# Patient Record
Sex: FEMALE | Race: BLACK OR AFRICAN AMERICAN | NOT HISPANIC OR LATINO | URBAN - METROPOLITAN AREA
[De-identification: names, ages, dates, MRNs, and addresses within clinical notes are randomized per-mention and may not be internally consistent; named-entity substitution may affect disease eponyms.]

---

## 2023-12-05 ENCOUNTER — OFFICE VISIT (OUTPATIENT)
Dept: INTERNAL MEDICINE CLINIC | Age: 53
End: 2023-12-05
Payer: COMMERCIAL

## 2023-12-05 ENCOUNTER — APPOINTMENT (OUTPATIENT)
Dept: LAB | Age: 53
End: 2023-12-05
Payer: COMMERCIAL

## 2023-12-05 VITALS
HEART RATE: 66 BPM | DIASTOLIC BLOOD PRESSURE: 76 MMHG | WEIGHT: 293 LBS | TEMPERATURE: 97 F | HEIGHT: 62 IN | SYSTOLIC BLOOD PRESSURE: 142 MMHG | OXYGEN SATURATION: 99 % | BODY MASS INDEX: 53.92 KG/M2

## 2023-12-05 DIAGNOSIS — Z11.59 NEED FOR HEPATITIS C SCREENING TEST: ICD-10-CM

## 2023-12-05 DIAGNOSIS — Z12.11 SCREENING FOR MALIGNANT NEOPLASM OF COLON: ICD-10-CM

## 2023-12-05 DIAGNOSIS — M19.90 ARTHRITIS: ICD-10-CM

## 2023-12-05 DIAGNOSIS — F41.9 ANXIETY AND DEPRESSION: ICD-10-CM

## 2023-12-05 DIAGNOSIS — Z23 ENCOUNTER FOR IMMUNIZATION: ICD-10-CM

## 2023-12-05 DIAGNOSIS — F32.A ANXIETY AND DEPRESSION: ICD-10-CM

## 2023-12-05 DIAGNOSIS — I10 PRIMARY HYPERTENSION: ICD-10-CM

## 2023-12-05 DIAGNOSIS — E55.9 VITAMIN D DEFICIENCY: ICD-10-CM

## 2023-12-05 DIAGNOSIS — E66.01 MORBID OBESITY WITH BMI OF 50.0-59.9, ADULT (HCC): ICD-10-CM

## 2023-12-05 DIAGNOSIS — E78.2 MIXED HYPERLIPIDEMIA: ICD-10-CM

## 2023-12-05 DIAGNOSIS — Z11.4 SCREENING FOR HIV (HUMAN IMMUNODEFICIENCY VIRUS): ICD-10-CM

## 2023-12-05 DIAGNOSIS — Z23 NEED FOR INFLUENZA VACCINATION: ICD-10-CM

## 2023-12-05 DIAGNOSIS — E66.01 MORBID OBESITY WITH BMI OF 50.0-59.9, ADULT (HCC): Primary | ICD-10-CM

## 2023-12-05 DIAGNOSIS — Z12.4 CERVICAL CANCER SCREENING: ICD-10-CM

## 2023-12-05 DIAGNOSIS — Z12.31 ENCOUNTER FOR SCREENING MAMMOGRAM FOR MALIGNANT NEOPLASM OF BREAST: ICD-10-CM

## 2023-12-05 PROBLEM — E78.5 HYPERLIPIDEMIA: Status: ACTIVE | Noted: 2023-12-05

## 2023-12-05 PROBLEM — J30.2 SEASONAL ALLERGIES: Status: ACTIVE | Noted: 2023-12-05

## 2023-12-05 LAB
25(OH)D3 SERPL-MCNC: 20 NG/ML (ref 30–100)
ALBUMIN SERPL BCP-MCNC: 4.4 G/DL (ref 3.5–5)
ALP SERPL-CCNC: 74 U/L (ref 34–104)
ALT SERPL W P-5'-P-CCNC: 11 U/L (ref 7–52)
ANION GAP SERPL CALCULATED.3IONS-SCNC: 12 MMOL/L
AST SERPL W P-5'-P-CCNC: 16 U/L (ref 13–39)
BASOPHILS # BLD AUTO: 0.03 THOUSANDS/ÂΜL (ref 0–0.1)
BASOPHILS NFR BLD AUTO: 1 % (ref 0–1)
BILIRUB SERPL-MCNC: 0.89 MG/DL (ref 0.2–1)
BUN SERPL-MCNC: 13 MG/DL (ref 5–25)
CALCIUM SERPL-MCNC: 9.9 MG/DL (ref 8.4–10.2)
CHLORIDE SERPL-SCNC: 103 MMOL/L (ref 96–108)
CHOLEST SERPL-MCNC: 241 MG/DL
CO2 SERPL-SCNC: 25 MMOL/L (ref 21–32)
CREAT SERPL-MCNC: 0.91 MG/DL (ref 0.6–1.3)
CRP SERPL QL: 8.5 MG/L
EOSINOPHIL # BLD AUTO: 0.06 THOUSAND/ÂΜL (ref 0–0.61)
EOSINOPHIL NFR BLD AUTO: 2 % (ref 0–6)
ERYTHROCYTE [DISTWIDTH] IN BLOOD BY AUTOMATED COUNT: 12.9 % (ref 11.6–15.1)
ERYTHROCYTE [SEDIMENTATION RATE] IN BLOOD: 41 MM/HOUR (ref 0–29)
EST. AVERAGE GLUCOSE BLD GHB EST-MCNC: 105 MG/DL
GFR SERPL CREATININE-BSD FRML MDRD: 72 ML/MIN/1.73SQ M
GLUCOSE P FAST SERPL-MCNC: 83 MG/DL (ref 65–99)
HBA1C MFR BLD: 5.3 %
HCT VFR BLD AUTO: 38.5 % (ref 34.8–46.1)
HCV AB SER QL: NORMAL
HDLC SERPL-MCNC: 63 MG/DL
HGB BLD-MCNC: 12.4 G/DL (ref 11.5–15.4)
HIV 1+2 AB+HIV1 P24 AG SERPL QL IA: NORMAL
HIV 2 AB SERPL QL IA: NORMAL
HIV1 AB SERPL QL IA: NORMAL
HIV1 P24 AG SERPL QL IA: NORMAL
IMM GRANULOCYTES # BLD AUTO: 0.01 THOUSAND/UL (ref 0–0.2)
IMM GRANULOCYTES NFR BLD AUTO: 0 % (ref 0–2)
LDLC SERPL CALC-MCNC: 164 MG/DL (ref 0–100)
LYMPHOCYTES # BLD AUTO: 1.2 THOUSANDS/ÂΜL (ref 0.6–4.47)
LYMPHOCYTES NFR BLD AUTO: 37 % (ref 14–44)
MCH RBC QN AUTO: 26.4 PG (ref 26.8–34.3)
MCHC RBC AUTO-ENTMCNC: 32.2 G/DL (ref 31.4–37.4)
MCV RBC AUTO: 82 FL (ref 82–98)
MONOCYTES # BLD AUTO: 0.27 THOUSAND/ÂΜL (ref 0.17–1.22)
MONOCYTES NFR BLD AUTO: 8 % (ref 4–12)
NEUTROPHILS # BLD AUTO: 1.64 THOUSANDS/ÂΜL (ref 1.85–7.62)
NEUTS SEG NFR BLD AUTO: 52 % (ref 43–75)
NRBC BLD AUTO-RTO: 0 /100 WBCS
PLATELET # BLD AUTO: 284 THOUSANDS/UL (ref 149–390)
PMV BLD AUTO: 10.9 FL (ref 8.9–12.7)
POTASSIUM SERPL-SCNC: 3.8 MMOL/L (ref 3.5–5.3)
PROT SERPL-MCNC: 7.4 G/DL (ref 6.4–8.4)
RBC # BLD AUTO: 4.7 MILLION/UL (ref 3.81–5.12)
SODIUM SERPL-SCNC: 140 MMOL/L (ref 135–147)
TRIGL SERPL-MCNC: 70 MG/DL
TSH SERPL DL<=0.05 MIU/L-ACNC: 2.13 UIU/ML (ref 0.45–4.5)
WBC # BLD AUTO: 3.21 THOUSAND/UL (ref 4.31–10.16)

## 2023-12-05 PROCEDURE — 90686 IIV4 VACC NO PRSV 0.5 ML IM: CPT

## 2023-12-05 PROCEDURE — 83036 HEMOGLOBIN GLYCOSYLATED A1C: CPT

## 2023-12-05 PROCEDURE — 86803 HEPATITIS C AB TEST: CPT

## 2023-12-05 PROCEDURE — 90471 IMMUNIZATION ADMIN: CPT

## 2023-12-05 PROCEDURE — 85025 COMPLETE CBC W/AUTO DIFF WBC: CPT

## 2023-12-05 PROCEDURE — 87389 HIV-1 AG W/HIV-1&-2 AB AG IA: CPT

## 2023-12-05 PROCEDURE — 80053 COMPREHEN METABOLIC PANEL: CPT

## 2023-12-05 PROCEDURE — 85652 RBC SED RATE AUTOMATED: CPT

## 2023-12-05 PROCEDURE — 82306 VITAMIN D 25 HYDROXY: CPT

## 2023-12-05 PROCEDURE — 84443 ASSAY THYROID STIM HORMONE: CPT

## 2023-12-05 PROCEDURE — 86430 RHEUMATOID FACTOR TEST QUAL: CPT

## 2023-12-05 PROCEDURE — 36415 COLL VENOUS BLD VENIPUNCTURE: CPT

## 2023-12-05 PROCEDURE — 86140 C-REACTIVE PROTEIN: CPT

## 2023-12-05 PROCEDURE — 80061 LIPID PANEL: CPT

## 2023-12-05 PROCEDURE — 99204 OFFICE O/P NEW MOD 45 MIN: CPT

## 2023-12-05 RX ORDER — ROSUVASTATIN CALCIUM 10 MG/1
10 TABLET, COATED ORAL DAILY
Qty: 90 TABLET | Refills: 1 | Status: SHIPPED | OUTPATIENT
Start: 2023-12-05

## 2023-12-05 RX ORDER — ERGOCALCIFEROL 1.25 MG/1
50000 CAPSULE ORAL WEEKLY
COMMUNITY
End: 2023-12-05 | Stop reason: SDUPTHER

## 2023-12-05 RX ORDER — ROSUVASTATIN CALCIUM 10 MG/1
10 TABLET, COATED ORAL DAILY
COMMUNITY
End: 2023-12-05 | Stop reason: SDUPTHER

## 2023-12-05 RX ORDER — ERGOCALCIFEROL 1.25 MG/1
50000 CAPSULE ORAL WEEKLY
Qty: 12 CAPSULE | Refills: 1 | Status: SHIPPED | OUTPATIENT
Start: 2023-12-05

## 2023-12-05 RX ORDER — VALSARTAN AND HYDROCHLOROTHIAZIDE 80; 12.5 MG/1; MG/1
1 TABLET, FILM COATED ORAL DAILY
Qty: 90 TABLET | Refills: 1 | Status: SHIPPED | OUTPATIENT
Start: 2023-12-05

## 2023-12-05 RX ORDER — ESCITALOPRAM OXALATE 10 MG/1
10 TABLET ORAL DAILY
Qty: 90 TABLET | Refills: 0 | Status: SHIPPED | OUTPATIENT
Start: 2023-12-05 | End: 2024-11-29

## 2023-12-05 RX ORDER — VALSARTAN AND HYDROCHLOROTHIAZIDE 80; 12.5 MG/1; MG/1
1 TABLET, FILM COATED ORAL DAILY
COMMUNITY
End: 2023-12-05 | Stop reason: SDUPTHER

## 2023-12-05 NOTE — ASSESSMENT & PLAN NOTE
Patient's medications include Crestor 10 mg daily  No labs or prior health covers it is available at this time  Will recheck lipid panel, patient states that the last time she had labs completed was May 2023  Encourage low-fat, cholesterol diet and encourage exercise 3 to 5 days/week as patient tolerates

## 2023-12-05 NOTE — ASSESSMENT & PLAN NOTE
BP mildly elevated today, will recheck at next visit and if still elevated, can consider medication adjustment  Advised low salt diet and increased exercise  Advised BP monitoring at home

## 2023-12-05 NOTE — ASSESSMENT & PLAN NOTE
Patient reports history of vitamin D deficiency  No labs or records available at this time, patient will sign release for records to be faxed over to office  She is currently taking vitamin D 2 50,000 units weekly  Will recheck vitamin D levels

## 2023-12-05 NOTE — ASSESSMENT & PLAN NOTE
Positive PHQ in office today. Depression symptoms include notably no motivation, no energy, sleeping more, eating less, difficulty with concentration. No SI/HI  Patient is noting increased anxiety and depression surrounding her health and being able to provide for herself  Discussed with patient options for management including beginning antidepressant. She is open to starting Lexapro. Will begin Lexapro 10 mg  Advised patient that she should begin with 5 mg daily for 1 week.   If she is tolerating this well then she can increase to 10 mg daily  Given handout for depression and after visit summary, includes National suicide helpline who patient should call immediately should she ever develop thoughts of harming herself or others  Follow-up scheduled for 12/26/2023  Discussed plan with Dr. Charmayne Bhat who is in agreement

## 2023-12-05 NOTE — PROGRESS NOTES
Assessment/Plan:    1. Morbid obesity with BMI of 50.0-59.9, adult [facility])  Assessment & Plan: Morbid obesity impacting patient's ability to ambulate   Currently works from home  Patient states that since 2020, she has been less active  Patient reports eating 1 meal a day  Discussed healthy eating habits and importance of weight loss. Advised patient to exercise as she is able to tolerate  Referrals to nutrition services and weight management given today  Will order labs as well    Orders:  -     Ambulatory referral to Nutrition Services; Future  -     Ambulatory referral to Weight Management; Future  -     TSH, 3rd generation with Free T4 reflex; Future  -     Hemoglobin A1C; Future    2. Arthritis  Assessment & Plan:  Per patient- she has a history of  arthritis  Patient has difficulty with ambulation, and is unable to walk for long distances  Mother has history of rheumatoid arthritis  Will order RF,  CRP, sed rate  Encourage patient that weight loss can aid in improving arthritis symptoms    Orders:  -     CBC and differential; Future  -     RF Screen w/ Reflex to [place]; Future  -     C-reactive protein; Future  -     Sedimentation rate, automated; Future    3. Mixed hyperlipidemia  Assessment & Plan:  Patient's medications include Crestor 10 mg daily  No labs or prior health covers it is available at this time  Will recheck lipid panel, patient states that the last time she had labs completed was May 2023  Encourage low-fat, cholesterol diet and encourage exercise 3 to 5 days/week as patient tolerates    Orders:  -     rosuvastatin (CRESTOR) 10 MG tablet; Take 1 tablet (10 mg total) by mouth daily  -     Comprehensive metabolic panel; Future  -     Lipid Panel with Direct LDL reflex; Future    4.  Vitamin D deficiency  Assessment & Plan:  Patient reports history of vitamin D deficiency  No labs or records available at this time, patient will sign release for records to be faxed over to office  She is currently taking vitamin D 2 50,000 units weekly  Will recheck vitamin D levels    Orders:  -     ergocalciferol (ERGOCALCIFEROL) 1.25 MG (14283 UT) capsule; Take 1 capsule (50,000 Units total) by mouth once a week  -     Vitamin D 25 hydroxy; Future    5. Primary hypertension  Assessment & Plan:  BP mildly elevated today, will recheck at next visit and if still elevated, can consider medication adjustment  Advised low salt diet and increased exercise  Advised BP monitoring at home     Orders:  -     valsartan-hydrochlorothiazide (DIOVAN-HCT) 80-12.5 MG per tablet; Take 1 tablet by mouth daily  -     Comprehensive metabolic panel; Future  -     CBC and differential; Future  -     Hemoglobin A1C; Future    6. Anxiety and depression  Assessment & Plan:  Positive PHQ in office today. Depression symptoms include notably no motivation, no energy, sleeping more, eating less, difficulty with concentration. No SI/HI  Patient is noting increased anxiety and depression surrounding her health and being able to provide for herself  Discussed with patient options for management including beginning antidepressant. She is open to starting Lexapro. Will begin Lexapro 10 mg  Advised patient that she should begin with 5 mg daily for 1 week. If she is tolerating this well then she can increase to 10 mg daily  Given handout for depression and after visit summary, includes National suicide helpline who patient should call immediately should she ever develop thoughts of harming herself or others  Follow-up scheduled for 12/26/2023  Discussed plan with Dr. Nestor Nunes who is in agreement    Orders:  -     [email or web address] [place]; Future  -     escitalopram (LEXAPRO) 10 mg tablet; Take 1 tablet (10 mg total) by mouth daily    7. Cervical cancer screening  -     Ambulatory Referral to Obstetrics / Gynecology; Future    8. Screening for malignant neoplasm of colon  -     Ambulatory Referral to Gastroenterology; Future    9.  Encounter for screening mammogram for malignant neoplasm of breast  -     Mammo screening bilateral w 3d & cad; Future; Expected date: 12/05/2023    10. Encounter for immunization  -     influenza vaccine, quadrivalent, 0.5 mL, preservative-free, for adult and pediatric patients 6 mos+ (AFLURIA, [address], FLULAVAL, FLUZONE)    11. Need for influenza vaccination  -     influenza vaccine, quadrivalent, 0.5 mL, preservative-free, for adult and pediatric patients 6 mos+ (AFLURIA, FLUARIX, FLULAVAL, FLUZONE)    12. Screening for HIV (human immunodeficiency virus)  -     HIV 1/2 AG/AB w Reflex [facility] for 2 yr old and above; Future    13. Need for hepatitis C screening test  -     Hepatitis C Antibody; Future         BMI Counseling: Body mass index is 56.69 kg/m². The BMI is above normal. Nutrition recommendations include decreasing portion sizes, encouraging healthy choices of fruits and vegetables, limiting drinks that contain sugar, moderation in carbohydrate intake, increasing intake of lean protein, reducing intake of saturated and trans fat and reducing intake of cholesterol. Exercise recommendations include exercising 3-5 times per week. Patient referred to nutritionist and weight management. Rationale for BMI follow-up plan is due to patient being overweight or obese. Depression Screening and Follow-up Plan: Patient's depression screening was positive with a PHQ-2 score of 4. Their PHQ-9 score was 6. Patient assessed for underlying major depression. Brief counseling provided and recommend additional follow-up/re-evaluation next office visit. Patient advised to follow-up with PCP for further management. Will begin Lexapro 10 mg daily. Referral to psychology placed for talk therapy. No SI/HI        M*Modal software was used to dictate this note. It may contain errors with dictating incorrect words or incorrect spelling. Please contact the provider directly with any questions.     Subjective:      Patient ID: Brittny Torres is a 47 y.o. female. Patient is a 31-year-old female with past medical history of hyperlipidemia, hypertension, arthritis presenting to the office to establish care. She recently moved here from [facility] where she was previously seen. She does report that she will get her records transferred over    Patient does have a history of arthritis in bilateral knees. Her pain is worse when walking, and she is unable to ambulate for significant distances. She is not currently on any therapy for pain. Her mother does have a history of rheumatoid arthritis, however the patient has never been tested. She is also noticing increased leg swelling over the last 10 years as she has been gaining weight. Denies any pain or erythema    Patient does report having labs done in May-2023, however results are unavailable at this time. Attempting to contact prior providers to get records. Patient is noting anxiety and depression. She states that she feels anxious almost all the time, and worries about her health, providing for herself. The patient also has noted depression symptoms including lack of motivation, lack of energy, increased sleep, decreased appetite, decreased concentration. She denies any SI/HI at this time, or ever having SI/HI    Patient works as a  filing [employer]. She currently lives with a friend        The following portions of the patient's history were reviewed and updated as appropriate: allergies, current medications, past family history, past medical history, past social history, past surgical history, and problem list.    Review of Systems   Constitutional:  Negative for chills, fatigue and fever. HENT:  Negative for congestion, ear pain, postnasal drip, rhinorrhea, sinus pressure, sore throat and trouble swallowing. Eyes:  Negative for pain and visual disturbance. Respiratory:  Negative for cough, shortness of breath and wheezing.     Cardiovascular: Negative for chest pain, palpitations and leg swelling. Gastrointestinal:  Negative for abdominal pain, nausea and vomiting. Genitourinary:  Negative for difficulty urinating, dysuria and hematuria. Musculoskeletal:  Positive for arthralgias (knees). Negative for back pain. Skin:  Negative for color change, rash and wound. Neurological:  Negative for dizziness, weakness, light-headedness, numbness and headaches. Psychiatric/Behavioral:  Positive for dysphoric mood. Negative for sleep disturbance. The patient is nervous/anxious. All other systems reviewed and are negative. Past Medical History:   Diagnosis Date    Arthritis     Hyperlipidemia     Hypertension          Current Outpatient Medications:     ergocalciferol (ERGOCALCIFEROL) 1.25 MG (32831 UT) capsule, Take 1 capsule (50,000 Units total) by mouth once a week, Disp: 12 capsule, Rfl: 1    escitalopram (LEXAPRO) 10 mg tablet, Take 1 tablet (10 mg total) by mouth daily, Disp: 90 tablet, Rfl: 0    rosuvastatin (CRESTOR) 10 MG tablet, Take 1 tablet (10 mg total) by mouth daily, Disp: 90 tablet, Rfl: 1    valsartan-hydrochlorothiazide (DIOVAN-HCT) 80-12.5 MG per tablet, Take 1 tablet by mouth daily, Disp: 90 tablet, Rfl: 1    No Known Allergies    Social History   History reviewed. No pertinent surgical history. Family History   Problem Relation Age of Onset    Arthritis Mother     Heart attack Mother 61    Hypertension Father     Heart disease Father     Prostate cancer Father     Diabetes Father        Objective:  /76 (BP Location: Left arm, Patient Position: Sitting, Cuff Size: Large)   Pulse 66   Temp (!) 97 °F (36.1 °C) (Temporal)   Ht 5' 2.21" (1.58 m)   Wt (!) 142 kg (312 lb)   SpO2 99% Comment: room air  BMI 56.69 kg/m²      Physical Exam  Vitals and nursing note reviewed. Constitutional:       General: She is not in acute distress. Appearance: She is obese. She is not ill-appearing.    HENT:      Head: Normocephalic and atraumatic. Right Ear: External ear normal.      Left Ear: External ear normal.      Nose: Nose normal.      Mouth/Throat:      Mouth: Mucous membranes are moist.      Pharynx: Oropharynx is clear. No posterior oropharyngeal erythema. Eyes:      General: No scleral icterus. Extraocular Movements: Extraocular movements intact. Conjunctiva/sclera: Conjunctivae normal.      Pupils: Pupils are equal, round, and reactive to light. Cardiovascular:      Rate and Rhythm: Normal rate and regular rhythm. Heart sounds: Normal heart sounds. No murmur heard. No friction rub. No gallop. Pulmonary:      Effort: Pulmonary effort is normal. No respiratory distress. Breath sounds: No wheezing, rhonchi or rales. Chest:      Chest wall: No tenderness. Musculoskeletal:         General: No tenderness. Cervical back: No tenderness. Right lower leg: Edema present. Left lower leg: Edema present. Skin:     General: Skin is warm and dry. Coloration: Skin is not pale. Findings: No bruising, erythema, lesion or rash. Neurological:      General: No focal deficit present. Mental Status: She is alert and oriented to person, place, and time. Mental status is at baseline. Psychiatric:         Behavior: Behavior normal.      Comments: Patient is tearful when discussing her symptoms of depression and anxiety.   She answered all questions appropriately

## 2023-12-05 NOTE — PATIENT INSTRUCTIONS
Obesity   AMBULATORY CARE:   Obesity  means your body mass index (BMI) is greater than 30. Your healthcare provider will use your age, height, and weight to measure your BMI. The risks of obesity include  many health problems, including injuries or physical disability. Diabetes (high blood sugar level)    High blood pressure or high cholesterol    Heart disease or heart failure    Stroke    Gallbladder or liver disease    Cancer of the colon, breast, prostate, liver, or kidney    Sleep apnea    Arthritis or gout    Screening  is done to check for health conditions before you have signs or symptoms. If you are 28to 79years old, your blood sugar level may be checked every 3 years for signs of prediabetes or diabetes. Your healthcare provider will check your blood pressure at each visit. High blood pressure can lead to a stroke or other problems. Your provider may check for signs of heart disease, cancer, or other health problems. Seek care immediately if:   You have a severe headache, confusion, or difficulty speaking. You have weakness on one side of your body. You have chest pain, sweating, or shortness of breath. Call your doctor if:   You have symptoms of gallbladder or liver disease, such as pain in your upper abdomen. You have knee or hip pain and discomfort while walking. You have symptoms of diabetes, such as intense hunger and thirst, and frequent urination. You have symptoms of sleep apnea, such as snoring or daytime sleepiness. You have questions or concerns about your condition or care. Treatment for obesity  focuses on helping you lose weight to improve your health. Even a small decrease in BMI can reduce the risk for many health problems. Your healthcare provider will help you set a weight-loss goal.  Lifestyle changes  are the first step in treating obesity. These include making healthy food choices and getting regular physical activity.  Your healthcare provider may suggest a weight-loss program that involves coaching, education, and therapy. Medicine  may help you lose weight when it is used with a healthy foods and physical activity. Surgery  can help you lose weight if you have obesity along with other health problems. Several types of weight-loss surgery are available. Ask your healthcare provider for more information. Tips for safe weight loss:   Set small, realistic goals. An example of a small goal is to walk for 20 minutes 5 days a week. Anther goal is to lose 5% of your body weight. Ask for support. Tell friends, family members, and coworkers about your goals. Ask someone to lose weight with you. You may also want to join a weight-loss support group. Identify foods or triggers that may cause you to overeat. Remove tempting high-calorie foods from your home and workplace. Place a bowl of fresh fruit on your kitchen counter. If stress causes you to eat, find other ways to cope with stress. A counselor or therapist may be able to help you. Track your daily calories and activity. Write down what you eat and drink. Also write down how many minutes of physical activity you do each day. Track your weekly weight. Weigh yourself in the morning, before you eat or drink anything but after you use the bathroom. Use the same scale, in the same place, and in similar clothing each time. Only weigh yourself 1 to 2 times each week, or as directed. You may become discouraged if you weigh yourself every day. Eating changes: You will need to eat 500 to 1,000 fewer calories each day than you currently eat to lose 1 to 2 pounds a week. The following changes will help you cut calories:  Eat smaller portions. Use small plates, no larger than 9 inches in diameter. Fill your plate half full of fruits and vegetables. Measure your food using measuring cups until you know what a serving size looks like. Eat 3 meals and 1 or 2 snacks each day.   Plan your meals in advance. Mamikellie Rendon and eat at home most of the time. Eat slowly. Do not skip meals. Skipping meals can lead to overeating later in the day. This can make it harder for you to lose weight. Talk with a dietitian to help you make a meal plan and schedule that is right for you. Eat fruits and vegetables at every meal.  They are low in calories and high in fiber, which makes you feel full. Do not add butter, margarine, or cream sauce to vegetables. Use herbs to season steamed vegetables. Eat less fat and fewer fried foods. Eat more baked or grilled chicken and fish. These protein sources are lower in calories and fat than red meat. Limit fast food. Dress your salads with olive oil and vinegar instead of bottled dressing. Limit the amount of sugar you eat. Do not drink sugary beverages. Limit alcohol. Activity changes:  Physical activity is good for your body in many ways. It helps you burn calories and build strong muscles. It decreases stress and depression, and improves your mood. It can also help you sleep better. Talk to your healthcare provider before you begin an exercise program.  Exercise for at least 30 minutes 5 days a week. Start slowly. Set aside time each day for physical activity that you enjoy and that is convenient for you. It is best to do both weight training and an activity that increases your heart rate, such as walking, bicycling, or swimming. Find ways to be more active. Do yard work and housecleaning. Walk up the stairs instead of using elevators. Spend your leisure time going to events that require walking, such as outdoor festivals or fairs. This extra physical activity can help you lose weight and keep it off. Follow up with your doctor as directed: You may need to meet with a dietitian. Write down your questions so you remember to ask them during your visits.   © Copyright [employer] 2023 Information is for End User's use only and may not be sold, redistributed or otherwise used for commercial purposes. The above information is an  only. It is not intended as medical advice for individual conditions or treatments. Talk to your doctor, nurse or pharmacist before following any medical regimen to see if it is safe and effective for you.

## 2023-12-05 NOTE — ASSESSMENT & PLAN NOTE
Per patient- she has a history of  arthritis  Patient has difficulty with ambulation, and is unable to walk for long distances  Mother has history of rheumatoid arthritis  Will order RF,  CRP, sed rate  Encourage patient that weight loss can aid in improving arthritis symptoms

## 2023-12-05 NOTE — ASSESSMENT & PLAN NOTE
Morbid obesity impacting patient's ability to ambulate   Currently works from home  Patient states that since 2020, she has been less active  Patient reports eating 1 meal a day  Discussed healthy eating habits and importance of weight loss.   Advised patient to exercise as she is able to tolerate  Referrals to nutrition services and weight management given today  Will order labs as well

## 2023-12-06 LAB — RHEUMATOID FACT SER QL LA: NEGATIVE

## 2023-12-26 ENCOUNTER — OFFICE VISIT (OUTPATIENT)
Dept: INTERNAL MEDICINE CLINIC | Age: 53
End: 2023-12-26
Payer: COMMERCIAL

## 2023-12-26 ENCOUNTER — TELEPHONE (OUTPATIENT)
Dept: INTERNAL MEDICINE CLINIC | Age: 53
End: 2023-12-26

## 2023-12-26 VITALS
HEART RATE: 94 BPM | TEMPERATURE: 97.8 F | HEIGHT: 62 IN | DIASTOLIC BLOOD PRESSURE: 70 MMHG | OXYGEN SATURATION: 98 % | WEIGHT: 293 LBS | BODY MASS INDEX: 53.92 KG/M2 | SYSTOLIC BLOOD PRESSURE: 138 MMHG

## 2023-12-26 DIAGNOSIS — E78.2 MIXED HYPERLIPIDEMIA: ICD-10-CM

## 2023-12-26 DIAGNOSIS — I10 PRIMARY HYPERTENSION: Primary | ICD-10-CM

## 2023-12-26 DIAGNOSIS — M19.90 ARTHRITIS: ICD-10-CM

## 2023-12-26 DIAGNOSIS — F32.A ANXIETY AND DEPRESSION: ICD-10-CM

## 2023-12-26 DIAGNOSIS — E66.01 MORBID OBESITY WITH BMI OF 50.0-59.9, ADULT (HCC): ICD-10-CM

## 2023-12-26 DIAGNOSIS — E55.9 VITAMIN D DEFICIENCY: ICD-10-CM

## 2023-12-26 DIAGNOSIS — F41.9 ANXIETY AND DEPRESSION: ICD-10-CM

## 2023-12-26 PROCEDURE — 99214 OFFICE O/P EST MOD 30 MIN: CPT

## 2023-12-26 RX ORDER — ROSUVASTATIN CALCIUM 20 MG/1
20 TABLET, COATED ORAL DAILY
Qty: 90 TABLET | Refills: 1 | Status: SHIPPED | OUTPATIENT
Start: 2023-12-26

## 2023-12-26 NOTE — ASSESSMENT & PLAN NOTE
Morbid obesity impacting patient's ability to ambulate   Currently works from home  Discussed healthy eating habits and importance of weight loss.  Advised patient to exercise as she is able to tolerate  Referrals to nutrition services and weight management given   Scheduled with nutritional services on 1-

## 2023-12-26 NOTE — PROGRESS NOTES
" Assessment/Plan:    1. Primary hypertension  Assessment & Plan:  Controlled. Continue current management   Advised low salt diet and increased exercise  Advised BP monitoring at home     Orders:  -     Comprehensive metabolic panel; Future  -     CBC and differential; Future    2. Mixed hyperlipidemia  Assessment & Plan:  Cholesterol 241, , triglycerides 70, HDL 63 increase Crestor to 20 mg daily  Encourage low-fat, cholesterol diet and encourage exercise 3 to 5 days/week as patient tolerates    Orders:  -     rosuvastatin (CRESTOR) 20 MG tablet; Take 1 tablet (20 mg total) by mouth daily  -     Comprehensive metabolic panel; Future  -     Lipid Panel with Direct LDL reflex; Future    3. Vitamin D deficiency  Assessment & Plan:  Vitamin D 20.0  She is currently taking vitamin D 2 50,000 units weekly  Will add vitamin D 2000 units daily  Recheck in 3 months    Orders:  -     Vitamin D 25 hydroxy; Future    4. Arthritis  Assessment & Plan:  Per patient- she has a history of  arthritis  Patient has difficulty with ambulation, and is unable to walk for long distances  Mother has history of rheumatoid arthritis  Mild elevations in CRP and sed rate, rheumatoid factor and  Mild encourage patient that weight loss can aid in improving arthritis symptoms  Advise Voltaren gel topically daily for bilateral knee pain  Will follow-up in 3 months  Patient has paperwork to be completed through employer for modifications.  Will fill out once paperwork is able to be obtained.  Patient will upload to [employer] so that we can print      5. Anxiety and depression  Assessment & Plan:  Tolerating Lexapro 10 mg well, continue  Denies any symptoms of anxiety or depression, notes that family and friends states she is \"back to her old self \"  No SI/HI  Follow-up in 3 months.  Call sooner if any change      6. Morbid obesity with BMI of 50.0-59.9, adult (HCC)  Assessment & Plan:  Morbid obesity impacting patient's ability to ambulate " "  Currently works from home  Discussed healthy eating habits and importance of weight loss.  Advised patient to exercise as she is able to tolerate  Referrals to nutrition services and weight management given   Scheduled with nutritional services on 1-                M*Modal software was used to dictate this note.  It may contain errors with dictating incorrect words or incorrect spelling. Please contact the provider directly with any questions.    Subjective:      Patient ID: Leora Lemus is a 53 y.o. female.    Patient is a 53-year-old female presenting to the office for 2-week follow-up and review of labs.     Hypertension  Does not monitor blood pressure at home, BP controlled today    Hyperlipidemia  Cholesterol 241, triglycerides 70, HDL 63,   Currently taking Crestor 10 mg daily    Anxiety and depression  Tolerating Lexapro 10 mg well  Feels that her mood has stabilized, friends and family have noticed that she is \"back to her old self \"  Denies any anxiety or depression today  Communicated with psychiatry, is on wait list    Morbid obesity  1 pound weight loss since last visit  Encourage patient to increase walking, and she states that she is not very active  Needs paperwork to be completed for work modifications    Mammo: Scheduled for April 2024  [facility]: GI appointment  1-30-24  Will call GYN to set appointment for cervical cancer screening        The following portions of the patient's history were reviewed and updated as appropriate: allergies, current medications, past family history, past medical history, past social history, past surgical history, and problem list.    Review of Systems   Constitutional:  Negative for chills, fatigue and fever.   HENT:  Negative for congestion, ear pain, postnasal drip, rhinorrhea, sinus pressure, sore throat and trouble swallowing.    Eyes:  Negative for pain and visual disturbance.   Respiratory:  Negative for cough, shortness of breath and wheezing.  " "  Cardiovascular:  Negative for chest pain, palpitations and leg swelling.   Gastrointestinal:  Negative for abdominal pain, nausea and vomiting.   Musculoskeletal:  Positive for arthralgias.   Skin:  Negative for color change, rash and wound.   Neurological:  Negative for dizziness, weakness, light-headedness, numbness and headaches.   Psychiatric/Behavioral:  Negative for dysphoric mood and sleep disturbance. The patient is not nervous/anxious.    All other systems reviewed and are negative.        Past Medical History:   Diagnosis Date    Arthritis     Hyperlipidemia     Hypertension          Current Outpatient Medications:     ergocalciferol (ERGOCALCIFEROL) 1.25 MG (59092 UT) capsule, Take 1 capsule (50,000 Units total) by mouth once a week, Disp: 12 capsule, Rfl: 1    escitalopram (LEXAPRO) 10 mg tablet, Take 1 tablet (10 mg total) by mouth daily, Disp: 90 tablet, Rfl: 0    rosuvastatin (CRESTOR) 20 MG tablet, Take 1 tablet (20 mg total) by mouth daily, Disp: 90 tablet, Rfl: 1    valsartan-hydrochlorothiazide (DIOVAN-HCT) 80-12.5 MG per tablet, Take 1 tablet by mouth daily, Disp: 90 tablet, Rfl: 1    No Known Allergies    Social History   History reviewed. No pertinent surgical history.  Family History   Problem Relation Age of Onset    Arthritis Mother     Heart attack Mother 63    Hypertension Father     Heart disease Father     Prostate cancer Father     Diabetes Father        Objective:  /70 (BP Location: Left arm, Patient Position: Sitting, Cuff Size: Large)   Pulse 94   Temp 97.8 °F (36.6 °C) (Temporal)   Ht 5' 2.21\" (1.58 m)   Wt (!) 141 kg (311 lb)   SpO2 98%   BMI 56.50 kg/m²      Physical Exam  Vitals and nursing note reviewed.   Constitutional:       General: She is not in acute distress.     Appearance: She is obese. She is not ill-appearing.   HENT:      Head: Normocephalic and atraumatic.      Right Ear: External ear normal.      Left Ear: External ear normal.      Nose: Nose normal. "      Mouth/Throat:      Mouth: Mucous membranes are moist.      Pharynx: Oropharynx is clear. No posterior oropharyngeal erythema.   Eyes:      General: No scleral icterus.     Conjunctiva/sclera: Conjunctivae normal.      Pupils: Pupils are equal, round, and reactive to light.   Cardiovascular:      Rate and Rhythm: Normal rate and regular rhythm.      Heart sounds: Normal heart sounds. No murmur heard.     No friction rub. No gallop.   Pulmonary:      Effort: Pulmonary effort is normal. No respiratory distress.      Breath sounds: Normal breath sounds. No wheezing, rhonchi or rales.   Musculoskeletal:      Cervical back: Normal range of motion and neck supple. No tenderness.      Right knee: No swelling or deformity. Tenderness present.      Left knee: No swelling or deformity. Tenderness present.      Right lower leg: No edema.      Left lower leg: No edema.      Comments: Bilateral diffuse tenderness to knees with palpation   Lymphadenopathy:      Cervical: No cervical adenopathy.   Skin:     General: Skin is warm and dry.      Coloration: Skin is not pale.      Findings: No erythema, lesion or rash.   Neurological:      Mental Status: She is alert.      Cranial Nerves: No cranial nerve deficit.      Coordination: Coordination normal.   Psychiatric:         Mood and Affect: Mood normal.         Behavior: Behavior normal.

## 2023-12-26 NOTE — ASSESSMENT & PLAN NOTE
Cholesterol 241, , triglycerides 70, HDL 63 increase Crestor to 20 mg daily  Encourage low-fat, cholesterol diet and encourage exercise 3 to 5 days/week as patient tolerates

## 2023-12-26 NOTE — ASSESSMENT & PLAN NOTE
Per patient- she has a history of  arthritis  Patient has difficulty with ambulation, and is unable to walk for long distances  Mother has history of rheumatoid arthritis  Mild elevations in CRP and sed rate, rheumatoid factor and  Mild encourage patient that weight loss can aid in improving arthritis symptoms  Advise Voltaren gel topically daily for bilateral knee pain  Will follow-up in 3 months  Patient has paperwork to be completed through employer for modifications.  Will fill out once paperwork is able to be obtained.  Patient will upload to [employer] so that we can print

## 2023-12-26 NOTE — ASSESSMENT & PLAN NOTE
Vitamin D 20.0  She is currently taking vitamin D 2 50,000 units weekly  Will add vitamin D 2000 units daily  Recheck in 3 months

## 2023-12-26 NOTE — ASSESSMENT & PLAN NOTE
Controlled. Continue current management   Advised low salt diet and increased exercise  Advised BP monitoring at home

## 2023-12-26 NOTE — PATIENT INSTRUCTIONS
- Add Vitamin D3 2000 IU daily   - Voltaren gel- apply to knees daily, avoid contact with face/eyes/moth

## 2023-12-26 NOTE — TELEPHONE ENCOUNTER
Received paperwork for patient to be completed, scanned into media and giving to rowan.      Patient already paid the 30$ form fee.

## 2023-12-26 NOTE — ASSESSMENT & PLAN NOTE
"Tolerating Lexapro 10 mg well, continue  Denies any symptoms of anxiety or depression, notes that family and friends states she is \"back to her old self \"  No SI/HI  Follow-up in 3 months.  Call sooner if any change  "

## 2024-01-30 ENCOUNTER — OFFICE VISIT (OUTPATIENT)
Dept: GASTROENTEROLOGY | Facility: AMBULARY SURGERY CENTER | Age: 54
End: 2024-01-30
Payer: COMMERCIAL

## 2024-01-30 VITALS
DIASTOLIC BLOOD PRESSURE: 96 MMHG | OXYGEN SATURATION: 97 % | WEIGHT: 293 LBS | SYSTOLIC BLOOD PRESSURE: 146 MMHG | BODY MASS INDEX: 53.92 KG/M2 | HEART RATE: 92 BPM | HEIGHT: 62 IN

## 2024-01-30 DIAGNOSIS — Z12.11 SCREENING FOR COLON CANCER: Primary | ICD-10-CM

## 2024-01-30 PROCEDURE — 99203 OFFICE O/P NEW LOW 30 MIN: CPT | Performed by: INTERNAL MEDICINE

## 2024-01-30 RX ORDER — SODIUM, POTASSIUM,MAG SULFATES 17.5-3.13G
177 SOLUTION, RECONSTITUTED, ORAL ORAL ONCE
Qty: 177 ML | Refills: 0 | Status: SHIPPED | OUTPATIENT
Start: 2024-01-30 | End: 2024-01-30

## 2024-01-30 NOTE — PROGRESS NOTES
[facility] - Outpatient Consultation  Leora Lemus 53 y.o. female MRN: [id number]  Encounter: [id number]      PCP: Phuong Good PA-C  Referring: No referring provider defined for this encounter.      ASSESSMENT AND PLAN:      1. Screening for colon cancer  Average risk, index screening colonoscopy  We reviewed risks benefits and alternates of colonoscopy.  Risks include but not limited to infection, bleeding, perforation, missed lesion.  Bowel prep instructions given.  - Colonoscopy; Future  - Na Sulfate-K Sulfate-Mg Sulf (Suprep Bowel Prep Kit) 17.5-3.13-1.6 GM/177ML SOLN; Take 177 mL by mouth once for 1 dose Take 177 mL by mouth once for 1 dose  Dispense: 177 mL; Refill: 0    ______________________________________________________________________    CC:  Chief Complaint   Patient presents with    Screening Colonoscopy       HPI:      Patient is a 53-year-old female referred for colon cancer screening.  She has HTN, arthritis, HLD, seasonal allergies, vitamin D deficiency, anxiety/depression, BMI 56.  She has bowel movements 1-2 times per day.  She denies any abdominal pain, upper GI symptoms.  She has no family history of GI cancers.      REVIEW OF SYSTEMS:    CONSTITUTIONAL: Denies any fever, chills, rigors, and weight loss.  HEENT: No earache or tinnitus. Denies hearing loss or visual disturbances.  CARDIOVASCULAR: No chest pain or palpitations.   RESPIRATORY: Denies any cough, hemoptysis, shortness of breath or dyspnea on exertion.  GASTROINTESTINAL: As noted in the History of Present Illness.   GENITOURINARY: No problems with urination. Denies any hematuria or dysuria.  NEUROLOGIC: No dizziness or vertigo, denies headaches.   MUSCULOSKELETAL: Denies any muscle or joint pain.   SKIN: Denies skin rashes or itching.   ENDOCRINE: Denies excessive thirst. Denies intolerance to heat or cold.  PSYCHOSOCIAL: Denies depression or anxiety. Denies any recent memory loss.       Historical  "Information   Past Medical History:   Diagnosis Date    Arthritis     Hyperlipidemia     Hypertension      History reviewed. No pertinent surgical history.  Social History   Social History     Substance and Sexual Activity   Alcohol Use Yes    Comment: socially     Social History     Substance and Sexual Activity   Drug Use Never     Social History     Tobacco Use   Smoking Status Never   Smokeless Tobacco Never     Family History   Problem Relation Age of Onset    Arthritis Mother     Heart attack Mother 63    Hypertension Father     Heart disease Father     Prostate cancer Father     Diabetes Father        Meds/Allergies       Current Outpatient Medications:     ergocalciferol (ERGOCALCIFEROL) 1.25 MG (80547 UT) capsule    escitalopram (LEXAPRO) 10 mg tablet    rosuvastatin (CRESTOR) 20 MG tablet    valsartan-hydrochlorothiazide (DIOVAN-HCT) 80-12.5 MG per tablet    No Known Allergies        Objective     Blood pressure 146/96, pulse 92, height 5' 2\" (1.575 m), weight (!) 140 kg (307 lb 12.8 oz), SpO2 97%. Body mass index is 56.3 kg/m².     PHYSICAL EXAM:      General Appearance:   Alert, cooperative, no distress   HEENT:   Normocephalic, atraumatic, anicteric.     Neck:  Supple, symmetrical, trachea midline   Lungs:   Clear to auscultation bilaterally; no rales, rhonchi or wheezing; respirations unlabored    Heart::   Regular rate and rhythm; no murmur, rub, or gallop.   Abdomen:   Soft, non-tender, non-distended; normal bowel sounds; no masses, no organomegaly    Genitalia:   Deferred    Rectal:   Deferred    Extremities:  No cyanosis, clubbing or edema    Pulses:  2+ and symmetric    Skin:  No jaundice, rashes, or lesions    Lymph nodes:  No palpable cervical lymphadenopathy        Lab Results:     Lab Results   Component Value Date    WBC 3.21 (L) 12/05/2023    HGB 12.4 12/05/2023    HCT 38.5 12/05/2023    MCV 82 12/05/2023     12/05/2023       Lab Results   Component Value Date    K 3.8 12/05/2023    " " 12/05/2023    CO2 25 12/05/2023    BUN 13 12/05/2023    CREATININE 0.91 12/05/2023    GLUF 83 12/05/2023    CALCIUM 9.9 12/05/2023    AST 16 12/05/2023    ALT 11 12/05/2023    ALKPHOS 74 12/05/2023    EGFR 72 12/05/2023     Relevant labs reviewed    No results found for: \"INR\", \"PROTIME\"      Radiology Results:   No results found.    Portions of the record may have been created with voice recognition software. Occasional wrong word or \"sound a like\" substitutions may have occurred due to the inherent limitations of voice recognition software. Read the chart carefully and recognize, using context, where substitutions have occurred.        "

## 2024-01-30 NOTE — PATIENT INSTRUCTIONS
Scheduled date of colonoscopy (as of today): May 2, 2024  Physician performing colonoscopy: Dr. Connolly   Location of colonoscopy: [facility]  Bowel prep reviewed with patient: Suprep  Instructions reviewed with patient by: Shweta BRYANT  Clearances: n/a

## 2024-02-05 ENCOUNTER — OFFICE VISIT (OUTPATIENT)
Dept: GYNECOLOGY | Facility: CLINIC | Age: 54
End: 2024-02-05
Payer: COMMERCIAL

## 2024-02-05 VITALS
SYSTOLIC BLOOD PRESSURE: 122 MMHG | BODY MASS INDEX: 53.92 KG/M2 | WEIGHT: 293 LBS | DIASTOLIC BLOOD PRESSURE: 90 MMHG | HEIGHT: 62 IN

## 2024-02-05 DIAGNOSIS — N92.6 IRREGULAR MENSES: ICD-10-CM

## 2024-02-05 DIAGNOSIS — Z12.11 SCREENING FOR COLORECTAL CANCER: ICD-10-CM

## 2024-02-05 DIAGNOSIS — Z01.419 ENCOUNTER FOR ROUTINE GYNECOLOGICAL EXAMINATION WITH PAPANICOLAOU SMEAR OF CERVIX: ICD-10-CM

## 2024-02-05 DIAGNOSIS — Z12.39 ENCOUNTER FOR SCREENING BREAST EXAMINATION: ICD-10-CM

## 2024-02-05 DIAGNOSIS — Z12.12 SCREENING FOR COLORECTAL CANCER: ICD-10-CM

## 2024-02-05 DIAGNOSIS — Z12.31 ENCOUNTER FOR SCREENING MAMMOGRAM FOR MALIGNANT NEOPLASM OF BREAST: ICD-10-CM

## 2024-02-05 DIAGNOSIS — Z12.4 CERVICAL CANCER SCREENING: ICD-10-CM

## 2024-02-05 DIAGNOSIS — Z01.419 ENCOUNTER FOR WELL WOMAN EXAM: Primary | ICD-10-CM

## 2024-02-05 PROCEDURE — G0145 SCR C/V CYTO,THINLAYER,RESCR: HCPCS | Performed by: PHYSICIAN ASSISTANT

## 2024-02-05 PROCEDURE — 99386 PREV VISIT NEW AGE 40-64: CPT | Performed by: PHYSICIAN ASSISTANT

## 2024-02-05 PROCEDURE — G0476 HPV COMBO ASSAY CA SCREEN: HCPCS | Performed by: PHYSICIAN ASSISTANT

## 2024-02-06 NOTE — PROGRESS NOTES
Assessment/Plan:    No problem-specific Assessment & Plan notes found for this encounter.       Diagnoses and all orders for this visit:    Encounter for well woman exam  -     Liquid-based pap, screening    Encounter for screening breast examination    Cervical cancer screening  -     Ambulatory Referral to Obstetrics / Gynecology  -     Liquid-based pap, screening    Irregular menses    Encounter for screening mammogram for malignant neoplasm of breast  -     Mammo screening bilateral w 3d & cad; Future    Screening for colorectal cancer  -     Ambulatory Referral to Gastroenterology; Future    Encounter for routine gynecological examination with Papanicolaou smear of cervix  -     Liquid-based pap, screening          Subjective:      Patient ID: Leora Lemus is a 53 y.o. female.    Pt presents as a new patient for her 1st annual exam today--  She has no complaints except occ hot flash  She has irregular bleeding  no pelvic pain  Bowel and bladder are regular  Colonoscopy--scheduled  No breast concerns today  Last mammo--scheduled    pap today.    Rx mamm  Rx colon  Daily ca, d  Natural hot flash info given        The following portions of the patient's history were reviewed and updated as appropriate: allergies, current medications, past family history, past medical history, past social history, past surgical history, and problem list.    Review of Systems   Constitutional:  Negative for chills, fever and unexpected weight change.   HENT:  Negative for ear pain and sore throat.    Eyes:  Negative for pain and visual disturbance.   Respiratory:  Negative for cough and shortness of breath.    Cardiovascular:  Negative for chest pain and palpitations.   Gastrointestinal:  Negative for abdominal pain, blood in stool, constipation, diarrhea and vomiting.   Genitourinary: Negative.  Negative for dysuria and hematuria.   Musculoskeletal:  Negative for arthralgias and back pain.   Skin:  Negative for color change and rash.  "  Neurological:  Negative for seizures and syncope.   All other systems reviewed and are negative.        Objective:      /90   Ht 5' 2\" (1.575 m)   Wt (!) 141 kg (309 lb 12.8 oz)   LMP 08/15/2023 (Exact Date)   BMI 56.66 kg/m²          Physical Exam  Vitals and nursing note reviewed.   Constitutional:       Appearance: Normal appearance. She is well-developed.   HENT:      Head: Normocephalic and atraumatic.   Chest:   Breasts:     Right: No inverted nipple, mass, nipple discharge or skin change.      Left: No inverted nipple, mass, nipple discharge or skin change.   Abdominal:      Palpations: Abdomen is soft.   Genitourinary:     General: Normal vulva.      Exam position: Supine.      Labia:         Right: No rash, tenderness or lesion.         Left: No rash, tenderness or lesion.       Vagina: Normal.      Cervix: No cervical motion tenderness, discharge or friability.      Uterus: Normal.       Adnexa:         Right: No mass, tenderness or fullness.          Left: No mass, tenderness or fullness.     Musculoskeletal:      Cervical back: Normal range of motion.   Lymphadenopathy:      Lower Body: No right inguinal adenopathy. No left inguinal adenopathy.   Neurological:      Mental Status: She is alert.           "

## 2024-02-07 ENCOUNTER — CLINICAL SUPPORT (OUTPATIENT)
Dept: NUTRITION | Facility: HOSPITAL | Age: 54
End: 2024-02-07
Payer: COMMERCIAL

## 2024-02-07 VITALS — WEIGHT: 293 LBS | BODY MASS INDEX: 56.99 KG/M2

## 2024-02-07 DIAGNOSIS — E66.01 MORBID OBESITY WITH BMI OF 50.0-59.9, ADULT (HCC): ICD-10-CM

## 2024-02-07 LAB
HPV HR 12 DNA CVX QL NAA+PROBE: NEGATIVE
HPV16 DNA CVX QL NAA+PROBE: NEGATIVE
HPV18 DNA CVX QL NAA+PROBE: NEGATIVE

## 2024-02-07 PROCEDURE — 97802 MEDICAL NUTRITION INDIV IN: CPT

## 2024-02-07 NOTE — PROGRESS NOTES
" Nutrition Assessment Form    Patient Name: Leora Lemus    YOB: 1970    Sex: Female     Assessment Date: 2/7/2024  Start Time: 9 am Stop Time: 10 am Total Minutes: 60     Data:  Present at session: self   Parent/Patient Concerns/reason for visit: \"I need to lose weight\"   Medical Dx/Reason for Referral: E66.01, Z68.54   Past Medical History:   Diagnosis Date    Arthritis     Hyperlipidemia     Hypertension        Current Outpatient Medications   Medication Sig Dispense Refill    ergocalciferol (ERGOCALCIFEROL) 1.25 MG (95076 UT) capsule Take 1 capsule (50,000 Units total) by mouth once a week 12 capsule 1    escitalopram (LEXAPRO) 10 mg tablet Take 1 tablet (10 mg total) by mouth daily 90 tablet 0    Na Sulfate-K Sulfate-Mg Sulf (Suprep Bowel Prep Kit) 17.5-3.13-1.6 GM/177ML SOLN Take 177 mL by mouth once for 1 dose Take 177 mL by mouth once for 1 dose 177 mL 0    rosuvastatin (CRESTOR) 20 MG tablet Take 1 tablet (20 mg total) by mouth daily 90 tablet 1    valsartan-hydrochlorothiazide (DIOVAN-HCT) 80-12.5 MG per tablet Take 1 tablet by mouth daily 90 tablet 1     No current facility-administered medications for this visit.        Additional Meds/Supplements:    Special Learning Needs/barriers to learning/any new barriers    Height: HC Readings from Last 5 Encounters:   No data found for HC      Weight: Wt Readings from Last 10 Encounters:   02/05/24 (!) 141 kg (309 lb 12.8 oz)   01/30/24 (!) 140 kg (307 lb 12.8 oz)   12/26/23 (!) 141 kg (311 lb)   12/05/23 (!) 142 kg (312 lb)     Estimated body mass index is 56.66 kg/m² as calculated from the following:    Height as of 2/5/24: 5' 2\" (1.575 m).    Weight as of 2/5/24: 141 kg (309 lb 12.8 oz).   Recent Weight Change: []Yes     [x]No  Amount:       Energy Needs: Lea- St. Jeor Equation:     No Known Allergies or intolerances    Social History     Substance and Sexual Activity   Alcohol Use Yes    Comment: socially    __n/a   Social History     Tobacco " Use   Smoking Status Never   Smokeless Tobacco Never       Who shops? patient   Who cooks/cooking methods/Eating out/take out habits   patient  Cooking methods: bake/air dukes/sautee    Take out: __1_ x/wk   Dining out ____ x/wk or month   Exercise: Nothing structured but has a desire to create a routine      Other: ie: Sleep habits/ stress level/ work habits household-lives with ?/ food security Goes to bed no later then 10:30   Wakes 7 am   Works from home  Wakes in the middle of the night- keeps bottle water at the bedside      Prior Nutritional Counseling? []Yes     [x]No  When:      Why:         Diet Hx:  Breakfast: Diet: 1 meal a day   Lunch: 3 pm leftovers from [employer]    Beverage: may have sprite or juice (snapple/ minute made/ simply juice once a day- 8-12 oz  Working on 64 oz water per day        Dinner: fried chicken from [employer] or [employer] with potato wedges or mac and cheese   Chinese recipe- mary rice, pork, purple cabbage, scallions with soy sauce  Spaghetti w/ chicken sausage and zucchini  Chicken with mashed potatoes with sour cream, green beans  Also roasts potatoes            Snacks: AM - mixed nuts with M&M's in AM   PM - individual serving bag of chips a few times a week  HS -    Other Notes/ Initial Assessment:  Using [employer] since second week in January. 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 Walked everywhere when she lived in [place] and prior to covid  Takes 25-30 minutes to finish a meal  Noted she was surprised that 1 portion was filling but did understand why    Regular discussed importance of regualr balanced meals, avoid skipping meals to maintain an efficient metabolism. Discussed balancing meals with lean protein, heart healthy fats, CHO and fiber creates the feeling of satiety.  Encouraged patient to continue to take 25-30 minutes to finish a  meal  Encouraged patient to avoid drinking kcals or dilute juices 50/50  Discussed importance of adequate fruits and vegetables, and appropriate serving sizes, including cooking methods, variety of colors daily, and how they help balance diet and food intake.  Portions of other foods may increase if adequate fruits and vegetables are not included on a daily basis.  Discussed components of anti-inflammatory diet- variety of types and colors of fruits and vegetables, unsaturated fats, Omega 3's, lean proteins     Updated assessment (Follow up note only):     Nutrition Diagnosis:   Overweight/obesity  related to Excess energy intake and decreased physical activity as evidenced by  BMI more than normative standard for age and sex (obesity-grade III 40+)       Any change or new dx since previous visit:     Nutrition Diagnosis:   N/a      Medical Nutrition Therapy Intervention:  [x]Individualized Meal Plan 8515-3102 kcals, 60 g protein per day []Understanding Lab Values   []Basic Pathophysiology of Disease []Food/Medication Interactions   []Food Diary [x]Exercise 150 mins of moderate activity weekly, discussed importance of variety of activity, including wt bearing activities 2-3x/wk x 10-15mins. Discussed importance of activity throughout the lifecycle and its impact on overall health/stress management, etc.   GOAL- CHAIR CARDIO 3 X'S A WEEK   []Lifestyle/Behavior Modification Techniques []Medication, Mechanism of Action   [x]Label Reading: PORTIONS/ KCAL []Self Blood Glucose Monitoring   []Weight/BMI Goals: gain/lose/maintain []Other -           Comprehension: []Excellent  []Very Good  [x]Good  []Fair   []Poor    Receptivity: []Excellent  []Very Good  [x]Good  []Fair   []Poor    Expected Compliance: []Excellent  []Very Good  [x]Good  []Fair   []Poor        Goals (initial)/ Progress made on previous goals/new goals:  1.Patient will have breakfast every day by next follow up   2. Patient will increase fruits intake to at  "least once a day by next follow up   3.       No follow-ups on file.  Labs:  CMP  Lab Results   Component Value Date    K 3.8 12/05/2023     12/05/2023    CO2 25 12/05/2023    BUN 13 12/05/2023    CREATININE 0.91 12/05/2023    GLUF 83 12/05/2023    CALCIUM 9.9 12/05/2023    AST 16 12/05/2023    ALT 11 12/05/2023    ALKPHOS 74 12/05/2023    EGFR 72 12/05/2023       BMP  Lab Results   Component Value Date    CALCIUM 9.9 12/05/2023    K 3.8 12/05/2023    CO2 25 12/05/2023     12/05/2023    BUN 13 12/05/2023    CREATININE 0.91 12/05/2023       Lipids  No results found for: \"CHOL\"  Lab Results   Component Value Date    HDL 63 12/05/2023     Lab Results   Component Value Date    LDLCALC 164 (H) 12/05/2023     Lab Results   Component Value Date    TRIG 70 12/05/2023     No results found for: \"CHOLHDL\"    Hemoglobin A1C  Lab Results   Component Value Date    HGBA1C 5.3 12/05/2023       Fasting Glucose  Lab Results   Component Value Date    GLUF 83 12/05/2023       Insulin     Thyroid  No results found for: \"TSH\", \"D5BQZKO\", \"N9BMIPZ\", \"THYROIDAB\"    Hepatic Function Panel  Lab Results   Component Value Date    ALT 11 12/05/2023    AST 16 12/05/2023    ALKPHOS 74 12/05/2023       Celiac Disease Antibody Panel  No results found for: \"ENDOMYSIAL IGA\", \"GLIADIN IGA\", \"GLIADIN IGG\", \"IGA\", \"TISSUE TRANSGLUT AB\", \"TTG IGA\"   Iron  No results found for: \"IRON\", \"TIBC\", \"FERRITIN\"         Bernadine Silva, JAMES  [facility]  [address] [id number]    "

## 2024-02-12 LAB
LAB AP GYN PRIMARY INTERPRETATION: NORMAL
Lab: NORMAL

## 2024-03-05 ENCOUNTER — RA CDI HCC (OUTPATIENT)
Dept: OTHER | Facility: HOSPITAL | Age: 54
End: 2024-03-05

## 2024-03-05 NOTE — PROGRESS NOTES
HCC coding opportunities          Chart Reviewed number of suggestions sent to Provider: 1  E66.01     Patients Insurance        Commercial Insurance: [email or web address]

## 2024-03-12 ENCOUNTER — OFFICE VISIT (OUTPATIENT)
Dept: INTERNAL MEDICINE CLINIC | Age: 54
End: 2024-03-12
Payer: COMMERCIAL

## 2024-03-12 VITALS
SYSTOLIC BLOOD PRESSURE: 130 MMHG | HEART RATE: 83 BPM | BODY MASS INDEX: 59.81 KG/M2 | DIASTOLIC BLOOD PRESSURE: 86 MMHG | WEIGHT: 293 LBS | TEMPERATURE: 98.5 F | OXYGEN SATURATION: 99 %

## 2024-03-12 DIAGNOSIS — F32.A ANXIETY AND DEPRESSION: ICD-10-CM

## 2024-03-12 DIAGNOSIS — I10 PRIMARY HYPERTENSION: ICD-10-CM

## 2024-03-12 DIAGNOSIS — E66.01 MORBID OBESITY WITH BMI OF 50.0-59.9, ADULT (HCC): Primary | ICD-10-CM

## 2024-03-12 DIAGNOSIS — E78.5 HYPERLIPIDEMIA, UNSPECIFIED HYPERLIPIDEMIA TYPE: ICD-10-CM

## 2024-03-12 DIAGNOSIS — F41.9 ANXIETY AND DEPRESSION: ICD-10-CM

## 2024-03-12 DIAGNOSIS — E55.9 VITAMIN D DEFICIENCY: ICD-10-CM

## 2024-03-12 DIAGNOSIS — M19.90 ARTHRITIS: ICD-10-CM

## 2024-03-12 PROCEDURE — 99214 OFFICE O/P EST MOD 30 MIN: CPT

## 2024-03-12 RX ORDER — TOPIRAMATE 25 MG/1
25 CAPSULE ORAL 2 TIMES DAILY
Qty: 180 CAPSULE | Refills: 0 | Status: SHIPPED | OUTPATIENT
Start: 2024-03-12 | End: 2024-03-12 | Stop reason: SDUPTHER

## 2024-03-12 RX ORDER — TOPIRAMATE 50 MG/1
TABLET, FILM COATED ORAL DAILY
Qty: 166 TABLET | Refills: 0 | Status: SHIPPED | OUTPATIENT
Start: 2024-03-12 | End: 2024-06-10

## 2024-03-12 NOTE — ASSESSMENT & PLAN NOTE
Currently taking vitamin D2 50,000 units weekly and vitamin D 2000 units daily  Vitamin D labs due prior to next follow-up appointment in 3 months

## 2024-03-12 NOTE — ASSESSMENT & PLAN NOTE
Patient feels well-controlled without Lexapro which she discontinued 1 month ago  Denies anxiety or depression today  Continue to monitor off medication.  Call if symptoms worsen

## 2024-03-12 NOTE — ASSESSMENT & PLAN NOTE
Continue Crestor 20 mg daily  Encourage low-fat/cholesterol diet and encouraged exercise 3-5 times per week  Lipid panel due prior to follow-up visit in 3 months

## 2024-03-12 NOTE — PROGRESS NOTES
Assessment/Plan:    1. Morbid obesity with BMI of 50.0-59.9, adult (formerly [facility])  Assessment & Plan:  Morbid obesity impacting patient's ability to ambulate   Discussed healthy eating habits and importance of weight loss.   Patient is following with nutritional services  Patient is currently eating 2 meals a day.  Goal is to have breakfast 7/10 days prior to next follow-up with nutritional services  Previous weight management referral  due to inability to contact patient.  Patient states that her phone number has now been updated, interested in weight management referral  Advised increased exercise.  3-5 times per week for 30 minutes as tolerated  Patient also interested in starting weight loss medication.  Discussed options including GLP-1's and Topamax.  Patient states that insurance previously did not cover GLP-1's for weight loss  Will start Topamax 50 mg daily x 14 days, then twice daily  Patient educated on side effects and uses  Advised to call the office if she is not tolerating medication  Follow-up in 2 to 3 months    Orders:  -     Ambulatory Referral to Weight Management; Future  -     topiramate (Topamax) 50 MG tablet; Take 1 tablet (50 mg total) by mouth daily for 14 days, THEN 1 tablet (50 mg total) every 12 (twelve) hours.    2. Primary hypertension  Assessment & Plan:  Controlled, 130/86 today. Continue current management   Advised low salt diet and increased exercise  Advised BP monitoring at home, goal BP less than 130/80  Follow-up in 3 months, if blood pressures remain elevated, will consider increasing valsartan-HCTZ      3. Anxiety and depression  Assessment & Plan:  Patient feels well-controlled without Lexapro which she discontinued 1 month ago  Denies anxiety or depression today  Continue to monitor off medication.  Call if symptoms worsen      4. Vitamin D deficiency  Assessment & Plan:  Currently taking vitamin D2 50,000 units weekly and vitamin D 2000 units daily  Vitamin D labs due  prior to next follow-up appointment in 3 months      5. Hyperlipidemia, unspecified hyperlipidemia type  Assessment & Plan:  Continue Crestor 20 mg daily  Encourage low-fat/cholesterol diet and encouraged exercise 3-5 times per week  Lipid panel due prior to follow-up visit in 3 months      6. Arthritis  Assessment & Plan:  Patient noted that arthritis was improving as she was eating healthier diet  Recently, diet has not been as healthy and she notes worsening arthritis  Again encouraged healthy diet and weight loss  Continue Voltaren gel as needed  FMLA paperwork expires today, patient does not need it to be refilled out as she is allowed to continue working from home                M*Modal software was used to dictate this note.  It may contain errors with dictating incorrect words or incorrect spelling. Please contact the provider directly with any questions.    Subjective:      Patient ID: Leora Lemus is a 53 y.o. female.    Patient is a 53-year-old female presenting to the office for 3-month follow-up  She has no concerns regarding her health today  Notes that she has gained weight recently, approximately 20 pounds since last visit  She notes that she now has family living with her including a 3, 6, 13-year-old  States that her eating habits have been poor since family has come to live with her  Also states that her mother passed away in February  She is currently now getting [employer] meals.  Previously was getting [employer]    Anxiety and depression  States that she has been well-controlled.  She stopped Lexapro approximately 1 month ago  States that depression was worsened due to mother's passing in February, however, has been doing well otherwise  Would like to continue off medication        The following portions of the patient's history were reviewed and updated as appropriate: allergies, current medications, past family history, past medical history, past social history, past surgical history, and problem  list.    Review of Systems   Constitutional:  Negative for chills, fatigue and fever.   HENT:  Negative for congestion, ear pain, postnasal drip, rhinorrhea, sinus pressure, sore throat and trouble swallowing.    Eyes:  Negative for pain and visual disturbance.   Respiratory:  Negative for cough, shortness of breath and wheezing.    Cardiovascular:  Negative for chest pain, palpitations and leg swelling.   Gastrointestinal:  Negative for abdominal pain, nausea and vomiting.   Genitourinary:  Negative for difficulty urinating, dysuria and hematuria.   Musculoskeletal:  Negative for arthralgias and back pain.   Skin:  Negative for color change, rash and wound.   Neurological:  Negative for dizziness, weakness, light-headedness, numbness and headaches.   Psychiatric/Behavioral:  Negative for dysphoric mood and sleep disturbance. The patient is not nervous/anxious.    All other systems reviewed and are negative.        Past Medical History:   Diagnosis Date    Arthritis     Hyperlipidemia     Hypertension          Current Outpatient Medications:     ergocalciferol (ERGOCALCIFEROL) 1.25 MG (11238 UT) capsule, Take 1 capsule (50,000 Units total) by mouth once a week, Disp: 12 capsule, Rfl: 1    Na Sulfate-K Sulfate-Mg Sulf (Suprep Bowel Prep Kit) 17.5-3.13-1.6 GM/177ML SOLN, Take 177 mL by mouth once for 1 dose Take 177 mL by mouth once for 1 dose, Disp: 177 mL, Rfl: 0    rosuvastatin (CRESTOR) 20 MG tablet, Take 1 tablet (20 mg total) by mouth daily, Disp: 90 tablet, Rfl: 1    topiramate (Topamax) 50 MG tablet, Take 1 tablet (50 mg total) by mouth daily for 14 days, THEN 1 tablet (50 mg total) every 12 (twelve) hours., Disp: 166 tablet, Rfl: 0    valsartan-hydrochlorothiazide (DIOVAN-HCT) 80-12.5 MG per tablet, Take 1 tablet by mouth daily, Disp: 90 tablet, Rfl: 1    No Known Allergies    Social History   History reviewed. No pertinent surgical history.  Family History   Problem Relation Age of Onset    Arthritis Mother      Heart attack Mother 63    Hypertension Father     Heart disease Father     Prostate cancer Father     Diabetes Father        Objective:  /86 (BP Location: Left arm, Patient Position: Sitting, Cuff Size: Large)   Pulse 83   Temp 98.5 °F (36.9 °C) (Temporal)   Wt (!) 148 kg (327 lb)   SpO2 99% Comment: room air  BMI 59.81 kg/m²      Physical Exam  Vitals and nursing note reviewed.   Constitutional:       General: She is not in acute distress.     Appearance: She is obese. She is not ill-appearing or diaphoretic.   HENT:      Head: Normocephalic and atraumatic.      Right Ear: External ear normal.      Left Ear: External ear normal.      Nose: Nose normal.      Mouth/Throat:      Mouth: Mucous membranes are moist.      Pharynx: Oropharynx is clear.   Eyes:      General: No scleral icterus.        Right eye: No discharge.         Left eye: No discharge.      Conjunctiva/sclera: Conjunctivae normal.      Pupils: Pupils are equal, round, and reactive to light.   Cardiovascular:      Rate and Rhythm: Normal rate and regular rhythm.      Heart sounds: Normal heart sounds. No murmur heard.     No friction rub. No gallop.   Pulmonary:      Effort: Pulmonary effort is normal. No respiratory distress.      Breath sounds: Normal breath sounds. No wheezing, rhonchi or rales.   Chest:      Chest wall: No tenderness.   Skin:     General: Skin is warm and dry.      Coloration: Skin is not pale.      Findings: No erythema.   Neurological:      General: No focal deficit present.      Mental Status: She is alert and oriented to person, place, and time. Mental status is at baseline.      Motor: No weakness.      Coordination: Coordination normal.      Gait: Gait abnormal.   Psychiatric:         Mood and Affect: Mood normal.         Behavior: Behavior normal.           Disclaimer: This note was generated with voice recognition software.  Phonetic, grammatical, and spelling errors may be present as a result.  Please contact  provider with any concerns or questions

## 2024-03-12 NOTE — ASSESSMENT & PLAN NOTE
Morbid obesity impacting patient's ability to ambulate   Discussed healthy eating habits and importance of weight loss.   Patient is following with nutritional services  Patient is currently eating 2 meals a day.  Goal is to have breakfast 7/10 days prior to next follow-up with nutritional services  Previous weight management referral  due to inability to contact patient.  Patient states that her phone number has now been updated, interested in weight management referral  Advised increased exercise.  3-5 times per week for 30 minutes as tolerated  Patient also interested in starting weight loss medication.  Discussed options including GLP-1's and Topamax.  Patient states that insurance previously did not cover GLP-1's for weight loss  Will start Topamax 50 mg daily x 14 days, then twice daily  Patient educated on side effects and uses  Advised to call the office if she is not tolerating medication  Follow-up in 2 to 3 months

## 2024-03-12 NOTE — ASSESSMENT & PLAN NOTE
Controlled, 130/86 today. Continue current management   Advised low salt diet and increased exercise  Advised BP monitoring at home, goal BP less than 130/80  Follow-up in 3 months, if blood pressures remain elevated, will consider increasing valsartan-HCTZ

## 2024-03-12 NOTE — ASSESSMENT & PLAN NOTE
Patient noted that arthritis was improving as she was eating healthier diet  Recently, diet has not been as healthy and she notes worsening arthritis  Again encouraged healthy diet and weight loss  Continue Voltaren gel as needed  FMLA paperwork expires today, patient does not need it to be refilled out as she is allowed to continue working from home

## 2024-04-03 ENCOUNTER — TELEPHONE (OUTPATIENT)
Age: 54
End: 2024-04-03

## 2024-04-04 ENCOUNTER — HOSPITAL ENCOUNTER (OUTPATIENT)
Dept: MAMMOGRAPHY | Facility: HOSPITAL | Age: 54
Discharge: HOME | End: 2024-04-04
Payer: COMMERCIAL

## 2024-04-04 VITALS — BODY MASS INDEX: 53.92 KG/M2 | HEIGHT: 62 IN | WEIGHT: 293 LBS

## 2024-04-04 DIAGNOSIS — Z12.31 ENCOUNTER FOR SCREENING MAMMOGRAM FOR MALIGNANT NEOPLASM OF BREAST: ICD-10-CM

## 2024-04-04 PROCEDURE — 77067 SCR MAMMO BI INCL CAD: CPT

## 2024-04-04 PROCEDURE — 77063 BREAST TOMOSYNTHESIS BI: CPT

## 2024-04-10 ENCOUNTER — OFFICE VISIT (OUTPATIENT)
Age: 54
End: 2024-04-10
Payer: COMMERCIAL

## 2024-04-10 ENCOUNTER — TRANSCRIBE ORDERS (OUTPATIENT)
Dept: GASTROENTEROLOGY | Facility: CLINIC | Age: 54
End: 2024-04-10

## 2024-04-10 VITALS
WEIGHT: 293 LBS | HEART RATE: 81 BPM | DIASTOLIC BLOOD PRESSURE: 86 MMHG | BODY MASS INDEX: 55.32 KG/M2 | HEIGHT: 61 IN | SYSTOLIC BLOOD PRESSURE: 150 MMHG | TEMPERATURE: 98.5 F

## 2024-04-10 DIAGNOSIS — I10 PRIMARY HYPERTENSION: ICD-10-CM

## 2024-04-10 DIAGNOSIS — E78.5 HYPERLIPIDEMIA, UNSPECIFIED HYPERLIPIDEMIA TYPE: ICD-10-CM

## 2024-04-10 DIAGNOSIS — E55.9 VITAMIN D DEFICIENCY: ICD-10-CM

## 2024-04-10 DIAGNOSIS — E66.01 MORBID OBESITY WITH BMI OF 50.0-59.9, ADULT (HCC): Primary | ICD-10-CM

## 2024-04-10 PROCEDURE — 99204 OFFICE O/P NEW MOD 45 MIN: CPT | Performed by: PHYSICIAN ASSISTANT

## 2024-04-10 NOTE — PROGRESS NOTES
Assessment/Plan:  Leora was seen today for consult.    Diagnoses and all orders for this visit:    Morbid obesity with BMI of 50.0-59.9, adult (HCC)  -     Ambulatory Referral to Weight Management    Primary hypertension    Hyperlipidemia, unspecified hyperlipidemia type    Vitamin D deficiency    - Discussed options of HealthyCORE-Intensive Lifestyle Intervention Program, Very Low Calorie Diet-VLCD, and Conservative Program and the role of weight loss medications.  - Explained the importance of making lifestyle changes first before starting anti-obesity medications.  - Patient should demonstrate lifestyle changes first before anti-obesity medication initiated.   - Patient is interested in pursuing Conservative Program  - Initial weight loss goal of 5-10% weight loss for improved health as studies have shown this is where we see the greatest impact on improving health and decreasing risk of obesity related conditions.  - Weight loss can improve patient's co-morbid conditions and/or prevent weight-related complications.  - Stop Bang 5/8 - discussed possible sleep study, patient defers at this time.   - Labs reviewed: As below.      General Recommendations:  Nutrition:  Eat breakfast daily.  Do not skip meals.     Food log (ie.) www.[employer].com, sparkpeople.com, loseit.com, calorieking.com, etc.    Practice mindful eating.  Be sure to set aside time to eat, eat slowly, and savor your food.    Hydration:    At least 64oz of water daily.  No sugar sweetened beverages.  No juice (eat the fruit instead).    Exercise:  Studies have shown that the ideal exercise goal is somewhere between 150 to 300 minutes of moderate intensity exercise a week.  Start with exercising 10 minutes every other day and gradually increase physical activity with a goal of at least 150 minutes of moderate intensity exercise a week, divided over at least 3 days a week.  An example of this would be exercising 30 minutes a day, 5 days a  week.  Resistance training can increase muscle mass and increase our resting metabolic rate.   FULL BODY resistance training is recommended 2-3 times a week.  Do not do this on consecutive days to allow for muscle recovery.    Aim for a bare minimum 5000 steps, even on days you do not exercise.    Monitoring:   Weigh yourself daily.  If this causes undue stress, then just weigh yourself once a week.  Weigh yourself the same time of the day with the same amount of clothing on.  Preferably this should be done after waking up, before you eat, and with no clothing or minimal clothing on.    Specific Goals:  No sugary beverages. At least 64oz of water daily.  Gradually increase physical activity to a goal of 5 days per week for 30 minutes of MODERATE intensity PLUS 2 days per week of FULL BODY resistance training  Goal protein intake of 60-80 grams per day    Calorie goal:  3488-0316 (Provided with meal plan to follow).    Return visit:    Calorie counting  Patient doing meal service (factor meals) for breakfast and dinner. Had been skipping lunch. Discussed adding a protein replacement in this time as she reports work being stressful to find time for dinner  Increasing physical activity as discussed  Continue topamax.  No adverse side effects - has last 16 lbs in the past 2 weeks already  If hitting a plateau, we can add Phentermine to mimic Contrave  Return to clinic in 3 months     Keep up the good work!      Total time spent reviewing chart, interviewing patient, examining patient, discussing plan, answering all questions, and documentin min.       ______________________________________________________________________        Subjective:   Chief Complaint   Patient presents with    Consult     Mwm consult       HPI: Leora Lemus  is a 53 y.o. female with history of HTN, anxiety, depression, HLD, and excess weight, here to pursue weight loss management.  Previous notes and records have been reviewed.      HTN -  managed on valsartan/HCTZ  HLD - managed on rosuvastatin  Obesity - started on topiramate 50mg BID    Hemoglobin A1c 5.3  Vitamin D 20 - on ergocalciferol   TSH WNL    Notes that she has gained weight recently, approximately 20 pounds since last visit  She notes that she now has family living with her including a 3, 6, 13-year-old  States that her eating habits have been poor since family has come to live with her  Also states that her mother passed away in February  She is currently now getting factor meals 1-2 months.  Previously was getting hello fresh    Recently started on topamax. She feels like it has decreased hunger and cravings. Feels like fast food is causing diarrhea while on the topamax.     Patient report she is not interested in bariatric surgery.      HPI  Wt Readings from Last 20 Encounters:   04/10/24 (!) 141 kg (311 lb 6.4 oz)   04/04/24 (!) 148 kg (327 lb)   03/12/24 (!) 148 kg (327 lb)   02/07/24 (!) 141 kg (311 lb 9.6 oz)   02/05/24 (!) 141 kg (309 lb 12.8 oz)   01/30/24 (!) 140 kg (307 lb 12.8 oz)   12/26/23 (!) 141 kg (311 lb)   12/05/23 (!) 142 kg (312 lb)     Excess Weight:  Highest weight: 327 lbs  Current weight: 311 lbs  Goal weight: STG 275lbs, LTG 220lbs  What has been tried: Diet and Exercise  Contributing factors: Poor Food Choices and Insufficient Physical Activity  Associated symptoms and effects: comorbid conditions and fatigue    Food logging:  B: potato pepper eggs OR egg, roth, cheese. Blueberry pancake + chicken sausage   S: skips  L: skips often  S: nuts occasionally   D: factor meals  - fully prepared meal service. Chicken, cauliflower (protein + veggie, small portion starch).   S: skips      Hydration: Water - unsure volume. Has increased recently    Juice (simply) - dilutes down with water. 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       Past Medical History:   Diagnosis Date    Arthritis     Hyperlipidemia     Hypertension      Patient denies personal and family history of  pancreatitis, thyroid cancer, MEN-2 tumors.  Denies any hx of glaucoma, seizures, kidney stones, gallstones.  Denies Hx of CAD, PAD, palpitations, arrhythmia.   Denies uncontrolled anxiety or depression, suicidal behavior or thinking , insomnia or sleep disturbance.     History reviewed. No pertinent surgical history.    The following portions of the patient's history were reviewed and updated as appropriate: allergies, current medications, past family history, past medical history, past social history, past surgical history, and problem list.    Review Of Systems:  Review of Systems   Constitutional:  Negative for fatigue and fever.   HENT:  Negative for sore throat, trouble swallowing and voice change.    Respiratory:  Negative for shortness of breath.    Cardiovascular:  Negative for chest pain.   Gastrointestinal:  Negative for abdominal pain, constipation, diarrhea, nausea and vomiting.   Endocrine: Negative for cold intolerance and heat intolerance.   Genitourinary:  Negative for difficulty urinating.        Nocturia   Musculoskeletal:  Negative for arthralgias and back pain.   Psychiatric/Behavioral:  Negative for suicidal ideas. The patient is not nervous/anxious.    All other systems reviewed and are negative.      Objective:  /86   Pulse 81   Temp 98.5 °F (36.9 °C) (Tympanic)   Ht 5' 1\" (1.549 m)   Wt (!) 141 kg (311 lb 6.4 oz)   LMP 08/15/2023 (Exact Date)   BMI 58.84 kg/m²   Physical Exam  Vitals and nursing note reviewed.   Constitutional:       General: She is not in acute distress.     Appearance: Normal appearance. She is obese. She is not ill-appearing, toxic-appearing or diaphoretic.   HENT:      Head: Normocephalic and atraumatic.   Eyes:      General:         Right eye: " No discharge.         Left eye: No discharge.      Conjunctiva/sclera: Conjunctivae normal.   Pulmonary:      Effort: Pulmonary effort is normal. No respiratory distress.   Musculoskeletal:         General: Normal range of motion.      Cervical back: Normal range of motion. No rigidity.      Right lower leg: Edema present.      Left lower leg: Edema present.   Skin:     Coloration: Skin is not pale.      Findings: No erythema or rash.   Neurological:      General: No focal deficit present.      Mental Status: She is alert.   Psychiatric:         Mood and Affect: Mood normal.         Labs and Imaging  Recent labs and imaging have been personally reviewed.  Lab Results   Component Value Date    WBC 3.21 (L) 12/05/2023    HGB 12.4 12/05/2023    HCT 38.5 12/05/2023    MCV 82 12/05/2023     12/05/2023     Lab Results   Component Value Date    SODIUM 140 12/05/2023    K 3.8 12/05/2023     12/05/2023    CO2 25 12/05/2023    AGAP 12 12/05/2023    BUN 13 12/05/2023    CREATININE 0.91 12/05/2023    GLUF 83 12/05/2023    CALCIUM 9.9 12/05/2023    AST 16 12/05/2023    ALT 11 12/05/2023    ALKPHOS 74 12/05/2023    TP 7.4 12/05/2023    TBILI 0.89 12/05/2023    EGFR 72 12/05/2023     Lab Results   Component Value Date    HGBA1C 5.3 12/05/2023     Lab Results   Component Value Date    LZI5MPQXDENW 2.133 12/05/2023     Lab Results   Component Value Date    CHOLESTEROL 241 (H) 12/05/2023     Lab Results   Component Value Date    HDL 63 12/05/2023     Lab Results   Component Value Date    TRIG 70 12/05/2023     Lab Results   Component Value Date    LDLCALC 164 (H) 12/05/2023

## 2024-04-10 NOTE — PATIENT INSTRUCTIONS
- Discussed options of HealthyCORE-Intensive Lifestyle Intervention Program, Very Low Calorie Diet-VLCD, and Conservative Program and the role of weight loss medications.  - Explained the importance of making lifestyle changes first before starting anti-obesity medications.  - Patient should demonstrate lifestyle changes first before anti-obesity medication initiated.   - Patient is interested in pursuing Conservative Program  - Initial weight loss goal of 5-10% weight loss for improved health as studies have shown this is where we see the greatest impact on improving health and decreasing risk of obesity related conditions.  - Weight loss can improve patient's co-morbid conditions and/or prevent weight-related complications.  - Stop Bang 5/8 - discussed possible sleep study, patient defers at this time.   - Labs reviewed: As below.      General Recommendations:  Nutrition:  Eat breakfast daily.  Do not skip meals.     Food log (ie.) www.[employer].com, sparkpeople.com, loseit.com, calorieking.com, etc.    Practice mindful eating.  Be sure to set aside time to eat, eat slowly, and savor your food.    Hydration:    At least 64oz of water daily.  No sugar sweetened beverages.  No juice (eat the fruit instead).    Exercise:  Studies have shown that the ideal exercise goal is somewhere between 150 to 300 minutes of moderate intensity exercise a week.  Start with exercising 10 minutes every other day and gradually increase physical activity with a goal of at least 150 minutes of moderate intensity exercise a week, divided over at least 3 days a week.  An example of this would be exercising 30 minutes a day, 5 days a week.  Resistance training can increase muscle mass and increase our resting metabolic rate.   FULL BODY resistance training is recommended 2-3 times a week.  Do not do this on consecutive days to allow for muscle recovery.    Aim for a bare minimum 5000 steps, even on days you do not  exercise.    Monitoring:   Weigh yourself daily.  If this causes undue stress, then just weigh yourself once a week.  Weigh yourself the same time of the day with the same amount of clothing on.  Preferably this should be done after waking up, before you eat, and with no clothing or minimal clothing on.    Specific Goals:  No sugary beverages. At least 64oz of water daily.  Gradually increase physical activity to a goal of 5 days per week for 30 minutes of MODERATE intensity PLUS 2 days per week of FULL BODY resistance training  Goal protein intake of 60-80 grams per day    Calorie goal:  7112-7112 (Provided with meal plan to follow).    Return visit:    Calorie counting  Patient doing meal service (factor meals) for breakfast and dinner. Had been skipping lunch. Discussed adding a protein replacement in this time as she reports work being stressful to find time for dinner  Increasing physical activity as discussed  Continue topamax.  No adverse side effects - has last 16 lbs in the past 2 weeks already  If hitting a plateau, we can add Phentermine to mimic Contrave  Return to clinic in 3 months     Keep up the good work!

## 2024-04-22 ENCOUNTER — TELEPHONE (OUTPATIENT)
Dept: GASTROENTEROLOGY | Facility: AMBULARY SURGERY CENTER | Age: 54
End: 2024-04-22

## 2024-04-29 ENCOUNTER — TELEPHONE (OUTPATIENT)
Age: 54
End: 2024-04-29

## 2024-04-29 NOTE — TELEPHONE ENCOUNTER
Reschedueled    Scheduled date of colonoscopy (as of today): 8-   Physician performing colonoscopy: Dr. Connolly   Location of colonoscopy: [facility]  Bowel prep reviewed with patient: suprep. Patient stated she has her prep instruction  Instructions reviewed with patient by: ROGER  Clearances: N/A

## 2024-05-10 ENCOUNTER — HOSPITAL ENCOUNTER (OUTPATIENT)
Dept: ULTRASOUND IMAGING | Facility: CLINIC | Age: 54
End: 2024-05-10
Payer: COMMERCIAL

## 2024-05-10 ENCOUNTER — HOSPITAL ENCOUNTER (OUTPATIENT)
Dept: MAMMOGRAPHY | Facility: CLINIC | Age: 54
End: 2024-05-10
Payer: COMMERCIAL

## 2024-05-10 VITALS — HEIGHT: 61 IN | BODY MASS INDEX: 55.32 KG/M2 | WEIGHT: 293 LBS

## 2024-05-10 DIAGNOSIS — R92.8 ABNORMAL MAMMOGRAM: ICD-10-CM

## 2024-05-10 PROCEDURE — G0279 TOMOSYNTHESIS, MAMMO: HCPCS

## 2024-05-10 PROCEDURE — 77065 DX MAMMO INCL CAD UNI: CPT

## 2024-05-10 PROCEDURE — 76642 ULTRASOUND BREAST LIMITED: CPT

## 2024-05-17 ENCOUNTER — OFFICE VISIT (OUTPATIENT)
Age: 54
End: 2024-05-17
Payer: COMMERCIAL

## 2024-05-17 VITALS
DIASTOLIC BLOOD PRESSURE: 100 MMHG | HEIGHT: 62 IN | TEMPERATURE: 97.5 F | HEART RATE: 76 BPM | BODY MASS INDEX: 53.92 KG/M2 | SYSTOLIC BLOOD PRESSURE: 164 MMHG | OXYGEN SATURATION: 98 % | WEIGHT: 293 LBS

## 2024-05-17 DIAGNOSIS — I10 PRIMARY HYPERTENSION: Primary | ICD-10-CM

## 2024-05-17 DIAGNOSIS — M19.90 ARTHRITIS: ICD-10-CM

## 2024-05-17 DIAGNOSIS — E78.2 MIXED HYPERLIPIDEMIA: ICD-10-CM

## 2024-05-17 DIAGNOSIS — E66.01 MORBID OBESITY WITH BMI OF 50.0-59.9, ADULT (HCC): ICD-10-CM

## 2024-05-17 DIAGNOSIS — F41.9 ANXIETY AND DEPRESSION: ICD-10-CM

## 2024-05-17 DIAGNOSIS — F32.A ANXIETY AND DEPRESSION: ICD-10-CM

## 2024-05-17 DIAGNOSIS — E78.5 HYPERLIPIDEMIA, UNSPECIFIED HYPERLIPIDEMIA TYPE: ICD-10-CM

## 2024-05-17 PROCEDURE — 99214 OFFICE O/P EST MOD 30 MIN: CPT

## 2024-05-17 RX ORDER — VALSARTAN AND HYDROCHLOROTHIAZIDE 80; 12.5 MG/1; MG/1
1 TABLET, FILM COATED ORAL DAILY
Qty: 90 TABLET | Refills: 1 | Status: CANCELLED | OUTPATIENT
Start: 2024-05-17

## 2024-05-17 RX ORDER — ROSUVASTATIN CALCIUM 20 MG/1
20 TABLET, COATED ORAL DAILY
Qty: 90 TABLET | Refills: 1 | Status: SHIPPED | OUTPATIENT
Start: 2024-05-17

## 2024-05-17 RX ORDER — VALSARTAN AND HYDROCHLOROTHIAZIDE 160; 12.5 MG/1; MG/1
1 TABLET, FILM COATED ORAL DAILY
Qty: 30 TABLET | Refills: 0 | Status: SHIPPED | OUTPATIENT
Start: 2024-05-17

## 2024-05-17 NOTE — ASSESSMENT & PLAN NOTE
Weight at last visit 327 pounds, today 321.  Lowest 311 last month  Continue Topamax twice daily  Continue healthy diet lifestyle, increase exercise  Continue follow-up with weight management

## 2024-05-17 NOTE — PROGRESS NOTES
Assessment/Plan:    1. Primary hypertension  Assessment & Plan:  BP elevated at 164/100 today will increase valsartan-HCTZ to 160-12.5  Advised to monitor blood pressure at home.  Goal BP is < 130/80.  Recommend low-sodium diet  Follow-up in 2 weeks for blood pressure recheck    Orders:  -     valsartan-hydrochlorothiazide (DIOVAN-HCT) 160-12.5 MG per tablet; Take 1 tablet by mouth daily  2. Arthritis  Assessment & Plan:  Improving with recently weight loss and healthier lifestyle  Continue healthy diet and weight loss, Voltaren gel as needed  3. Anxiety and depression  Assessment & Plan:  Stable off Lexapro  4. Hyperlipidemia, unspecified hyperlipidemia type  Assessment & Plan:  Continue Crestor 20 mg daily  Encourage low-fat/cholesterol diet and encouraged exercise 3-5 times per week  Lipid panel due prior to follow-up visit in 3 months  5. Morbid obesity with BMI of 50.0-59.9, adult (HCC)  Assessment & Plan:  Weight at last visit 327 pounds, today 321.  Lowest 311 last month  Continue Topamax twice daily  Continue healthy diet lifestyle, increase exercise  Continue follow-up with weight management  6. Mixed hyperlipidemia  Assessment & Plan:  Continue Crestor 20 mg daily  Encourage low-fat/cholesterol diet and encouraged exercise 3-5 times per week  Lipid panel due prior to follow-up visit in 3 months  Orders:  -     rosuvastatin (CRESTOR) 20 MG tablet; Take 1 tablet (20 mg total) by mouth daily     Patient advised to have labs completed prior to 2-week follow-up for blood pressure recheck.  Orders already in chart         M*Modal software was used to dictate this note.  It may contain errors with dictating incorrect words or incorrect spelling. Please contact the provider directly with any questions.    Subjective:      Patient ID: Leora Lemus is a 54 y.o. female.    Patient is a 54-year-old female presenting to the office for 2 month follow-up    Obesity  Started on Topamax 50 mg twice daily  Establish care  with weight management on 4- who recommended continuing same therapy at this time  Noting clothes are looser and she is feeling better  Notes she has been able to complete more activities, walk further distances without symptoms, walk up and down steps with less symptoms of fatigue  Notes no side effects from medication    Hypertension  Taking valsartan-HCTZ daily  Has not been monitoring blood pressure at home  Denies chest pain, shortness of breath, visual disturbance, headache, dizziness, lightheadedness    Anxiety and depression  Has been off Lexapro for 3 months.  Feels well-controlled    Lab orders in chart.  Patient did not complete prior to visit today.  Will recommend completion prior to next follow-up              The following portions of the patient's history were reviewed and updated as appropriate: allergies, current medications, past family history, past medical history, past social history, past surgical history, and problem list.    Review of Systems   Constitutional:  Negative for chills, fatigue and fever.   HENT:  Negative for congestion, ear pain, postnasal drip, rhinorrhea, sinus pressure, sore throat and trouble swallowing.    Eyes:  Negative for pain and visual disturbance.   Respiratory:  Negative for cough, shortness of breath and wheezing.    Cardiovascular:  Negative for chest pain, palpitations and leg swelling.   Gastrointestinal:  Negative for abdominal pain, nausea and vomiting.   Genitourinary:  Negative for difficulty urinating, dysuria and hematuria.   Musculoskeletal:  Positive for arthralgias. Negative for back pain.   Neurological:  Negative for dizziness, weakness, light-headedness, numbness and headaches.   Psychiatric/Behavioral:  Negative for dysphoric mood and sleep disturbance. The patient is not nervous/anxious.    All other systems reviewed and are negative.        Past Medical History:   Diagnosis Date    Arthritis     Hyperlipidemia     Hypertension   "        Current Outpatient Medications:     ergocalciferol (ERGOCALCIFEROL) 1.25 MG (80192 UT) capsule, Take 1 capsule (50,000 Units total) by mouth once a week, Disp: 12 capsule, Rfl: 1    rosuvastatin (CRESTOR) 20 MG tablet, Take 1 tablet (20 mg total) by mouth daily, Disp: 90 tablet, Rfl: 1    topiramate (Topamax) 50 MG tablet, Take 1 tablet (50 mg total) by mouth daily for 14 days, THEN 1 tablet (50 mg total) every 12 (twelve) hours., Disp: 166 tablet, Rfl: 0    valsartan-hydrochlorothiazide (DIOVAN-HCT) 160-12.5 MG per tablet, Take 1 tablet by mouth daily, Disp: 30 tablet, Rfl: 0    No Known Allergies    Social History   History reviewed. No pertinent surgical history.  Family History   Problem Relation Age of Onset    Arthritis Mother     Heart attack Mother 63    Hypertension Father     Heart disease Father     Prostate cancer Father     Diabetes Father     No Known Problems Maternal Grandmother     No Known Problems Maternal Grandfather     No Known Problems Paternal Grandmother     No Known Problems Paternal Grandfather     Cancer Maternal Aunt         not sure which type    No Known Problems Maternal Aunt     No Known Problems Maternal Aunt     No Known Problems Maternal Aunt     No Known Problems Paternal Aunt        Objective:  /100 (BP Location: Left arm, Patient Position: Sitting, Cuff Size: Large)   Pulse 76   Temp 97.5 °F (36.4 °C) (Temporal)   Ht 5' 1.85\" (1.571 m)   Wt (!) 146 kg (321 lb 3.2 oz)   LMP 08/15/2023 (Exact Date)   SpO2 98%   BMI 59.03 kg/m²      Physical Exam  Vitals and nursing note reviewed.   Constitutional:       General: She is not in acute distress.     Appearance: Normal appearance. She is not ill-appearing.   HENT:      Head: Normocephalic and atraumatic.      Right Ear: External ear normal.      Left Ear: External ear normal.      Nose: Nose normal.      Mouth/Throat:      Mouth: Mucous membranes are moist.      Pharynx: Oropharynx is clear.   Eyes:      General: " No scleral icterus.     Extraocular Movements: Extraocular movements intact.      Conjunctiva/sclera: Conjunctivae normal.      Pupils: Pupils are equal, round, and reactive to light.   Cardiovascular:      Rate and Rhythm: Normal rate and regular rhythm.      Heart sounds: Normal heart sounds. No murmur heard.     No friction rub. No gallop.   Pulmonary:      Effort: Pulmonary effort is normal. No respiratory distress.      Breath sounds: Normal breath sounds. No wheezing, rhonchi or rales.   Chest:      Chest wall: No tenderness.   Musculoskeletal:      Right lower leg: No edema.      Left lower leg: No edema.   Skin:     General: Skin is warm and dry.   Neurological:      General: No focal deficit present.      Mental Status: She is alert and oriented to person, place, and time. Mental status is at baseline.   Psychiatric:         Mood and Affect: Mood normal.         Behavior: Behavior normal.           Disclaimer: This note was generated with voice recognition software.  Phonetic, grammatical, and spelling errors may be present as a result.  Please contact provider with any concerns or questions

## 2024-05-17 NOTE — ASSESSMENT & PLAN NOTE
Improving with recently weight loss and healthier lifestyle  Continue healthy diet and weight loss, Voltaren gel as needed

## 2024-05-17 NOTE — ASSESSMENT & PLAN NOTE
BP elevated at 164/100 today will increase valsartan-HCTZ to 160-12.5  Advised to monitor blood pressure at home.  Goal BP is < 130/80.  Recommend low-sodium diet  Follow-up in 2 weeks for blood pressure recheck

## 2024-06-21 ENCOUNTER — RA CDI HCC (OUTPATIENT)
Dept: OTHER | Facility: HOSPITAL | Age: 54
End: 2024-06-21

## 2024-06-21 NOTE — PROGRESS NOTES
HCC coding opportunities       Chart reviewed, no opportunity found: CHART REVIEWED, NO OPPORTUNITY FOUND        Patients Insurance        Commercial Insurance: [employer]

## 2024-07-13 ENCOUNTER — APPOINTMENT (OUTPATIENT)
Dept: LAB | Age: 54
End: 2024-07-13
Payer: COMMERCIAL

## 2024-07-13 DIAGNOSIS — E78.2 MIXED HYPERLIPIDEMIA: ICD-10-CM

## 2024-07-13 DIAGNOSIS — E55.9 VITAMIN D DEFICIENCY: ICD-10-CM

## 2024-07-13 DIAGNOSIS — I10 PRIMARY HYPERTENSION: ICD-10-CM

## 2024-07-13 LAB
25(OH)D3 SERPL-MCNC: 35.6 NG/ML (ref 30–100)
ALBUMIN SERPL BCG-MCNC: 4.2 G/DL (ref 3.5–5)
ALP SERPL-CCNC: 85 U/L (ref 34–104)
ALT SERPL W P-5'-P-CCNC: 14 U/L (ref 7–52)
ANION GAP SERPL CALCULATED.3IONS-SCNC: 13 MMOL/L (ref 4–13)
AST SERPL W P-5'-P-CCNC: 18 U/L (ref 13–39)
BASOPHILS # BLD AUTO: 0.04 THOUSANDS/ÂΜL (ref 0–0.1)
BASOPHILS NFR BLD AUTO: 1 % (ref 0–1)
BILIRUB SERPL-MCNC: 0.74 MG/DL (ref 0.2–1)
BUN SERPL-MCNC: 18 MG/DL (ref 5–25)
CALCIUM SERPL-MCNC: 10 MG/DL (ref 8.4–10.2)
CHLORIDE SERPL-SCNC: 107 MMOL/L (ref 96–108)
CHOLEST SERPL-MCNC: 213 MG/DL
CO2 SERPL-SCNC: 22 MMOL/L (ref 21–32)
CREAT SERPL-MCNC: 1.08 MG/DL (ref 0.6–1.3)
EOSINOPHIL # BLD AUTO: 0.11 THOUSAND/ÂΜL (ref 0–0.61)
EOSINOPHIL NFR BLD AUTO: 3 % (ref 0–6)
ERYTHROCYTE [DISTWIDTH] IN BLOOD BY AUTOMATED COUNT: 14.1 % (ref 11.6–15.1)
GFR SERPL CREATININE-BSD FRML MDRD: 58 ML/MIN/1.73SQ M
GLUCOSE P FAST SERPL-MCNC: 95 MG/DL (ref 65–99)
HCT VFR BLD AUTO: 38.6 % (ref 34.8–46.1)
HDLC SERPL-MCNC: 71 MG/DL
HGB BLD-MCNC: 12.3 G/DL (ref 11.5–15.4)
IMM GRANULOCYTES # BLD AUTO: 0.01 THOUSAND/UL (ref 0–0.2)
IMM GRANULOCYTES NFR BLD AUTO: 0 % (ref 0–2)
LDLC SERPL CALC-MCNC: 125 MG/DL (ref 0–100)
LYMPHOCYTES # BLD AUTO: 1.76 THOUSANDS/ÂΜL (ref 0.6–4.47)
LYMPHOCYTES NFR BLD AUTO: 43 % (ref 14–44)
MCH RBC QN AUTO: 26.7 PG (ref 26.8–34.3)
MCHC RBC AUTO-ENTMCNC: 31.9 G/DL (ref 31.4–37.4)
MCV RBC AUTO: 84 FL (ref 82–98)
MONOCYTES # BLD AUTO: 0.36 THOUSAND/ÂΜL (ref 0.17–1.22)
MONOCYTES NFR BLD AUTO: 9 % (ref 4–12)
NEUTROPHILS # BLD AUTO: 1.78 THOUSANDS/ÂΜL (ref 1.85–7.62)
NEUTS SEG NFR BLD AUTO: 44 % (ref 43–75)
NRBC BLD AUTO-RTO: 0 /100 WBCS
PLATELET # BLD AUTO: 323 THOUSANDS/UL (ref 149–390)
PMV BLD AUTO: 10.7 FL (ref 8.9–12.7)
POTASSIUM SERPL-SCNC: 3.5 MMOL/L (ref 3.5–5.3)
PROT SERPL-MCNC: 7.5 G/DL (ref 6.4–8.4)
RBC # BLD AUTO: 4.61 MILLION/UL (ref 3.81–5.12)
SODIUM SERPL-SCNC: 142 MMOL/L (ref 135–147)
TRIGL SERPL-MCNC: 84 MG/DL
WBC # BLD AUTO: 4.06 THOUSAND/UL (ref 4.31–10.16)

## 2024-07-13 PROCEDURE — 80053 COMPREHEN METABOLIC PANEL: CPT

## 2024-07-13 PROCEDURE — 36415 COLL VENOUS BLD VENIPUNCTURE: CPT

## 2024-07-13 PROCEDURE — 80061 LIPID PANEL: CPT

## 2024-07-13 PROCEDURE — 82306 VITAMIN D 25 HYDROXY: CPT

## 2024-07-13 PROCEDURE — 85025 COMPLETE CBC W/AUTO DIFF WBC: CPT

## 2024-07-19 ENCOUNTER — OFFICE VISIT (OUTPATIENT)
Dept: INTERNAL MEDICINE CLINIC | Facility: OTHER | Age: 54
End: 2024-07-19
Payer: COMMERCIAL

## 2024-07-19 VITALS
SYSTOLIC BLOOD PRESSURE: 124 MMHG | HEART RATE: 90 BPM | TEMPERATURE: 98.4 F | DIASTOLIC BLOOD PRESSURE: 76 MMHG | OXYGEN SATURATION: 97 % | BODY MASS INDEX: 53.92 KG/M2 | HEIGHT: 62 IN | WEIGHT: 293 LBS

## 2024-07-19 DIAGNOSIS — E55.9 VITAMIN D DEFICIENCY: ICD-10-CM

## 2024-07-19 DIAGNOSIS — E66.01 MORBID OBESITY WITH BMI OF 50.0-59.9, ADULT (HCC): ICD-10-CM

## 2024-07-19 DIAGNOSIS — E78.2 MIXED HYPERLIPIDEMIA: ICD-10-CM

## 2024-07-19 DIAGNOSIS — I10 PRIMARY HYPERTENSION: Primary | ICD-10-CM

## 2024-07-19 PROCEDURE — 99214 OFFICE O/P EST MOD 30 MIN: CPT

## 2024-07-19 RX ORDER — ROSUVASTATIN CALCIUM 40 MG/1
40 TABLET, COATED ORAL DAILY
Qty: 90 TABLET | Refills: 1 | Status: SHIPPED | OUTPATIENT
Start: 2024-07-19

## 2024-07-19 RX ORDER — ERGOCALCIFEROL 1.25 MG/1
50000 CAPSULE ORAL WEEKLY
Qty: 12 CAPSULE | Refills: 1 | Status: SHIPPED | OUTPATIENT
Start: 2024-07-19

## 2024-07-19 NOTE — ASSESSMENT & PLAN NOTE
Weight  today 324.  Lowest 311 last month  Continue Topamax twice daily  Continue healthy diet lifestyle, increase exercise  Continue follow-up with weight management

## 2024-07-19 NOTE — PROGRESS NOTES
Assessment/Plan:    1. Primary hypertension  Assessment & Plan:  Controlled. Continue current regimen  Advised to monitor blood pressure at home.  Goal BP is < 130/80.  Contact our office for consistent elevations.    Recommend low sodium diet.  Exercise 30 minutes 5 times a week as tolerated.  Recommend yearly eye exam.        2. Vitamin D deficiency  Assessment & Plan:  Improved.  Continue vitamin D 50,000 IU weekly, vitamin D 2000 IU daily  Orders:  -     ergocalciferol (ERGOCALCIFEROL) 1.25 MG (61735 UT) capsule; Take 1 capsule (50,000 Units total) by mouth once a week  3. Mixed hyperlipidemia  Assessment & Plan:  Cholesterol 213, triglyceride 84, HDL 71,   ASCVD risk 484.4%  Will increase Crestor to 40 mg daily  Encourage low-fat/cholesterol diet and exercise 3-5 times per week  Orders:  -     rosuvastatin (CRESTOR) 40 MG tablet; Take 1 tablet (40 mg total) by mouth daily  4. Morbid obesity with BMI of 50.0-59.9, adult (HCC)  Assessment & Plan:  Weight  today 324.  Lowest 311 last month  Continue Topamax twice daily  Continue healthy diet lifestyle, increase exercise  Continue follow-up with weight management            M*Modal software was used to dictate this note.  It may contain errors with dictating incorrect words or incorrect spelling. Please contact the provider directly with any questions.    Subjective:      Patient ID: Leora Lemus is a 54 y.o. female.    HPI    Patient is a 54-year-old female presenting to the office for 1 month follow-up  Labs reviewed with patient  CMP, CBC, vitamin D overall unremarkable  Cholesterol 213, triglyceride 84, HDL 71,     Obesity  On Topamax 50 mg twice daily  Following with pain management  Notes that her meals have not been balanced lately, encouraged her to restart meal planning, follow-up with nutrition     Hypertension  Taking valsartan-HCTZ daily  Not monitoring blood pressure at homeAnxiety and depression  Feels well-controlled off Lexapro    The  following portions of the patient's history were reviewed and updated as appropriate: allergies, current medications, past family history, past medical history, past social history, past surgical history, and problem list.    Review of Systems   Constitutional:  Negative for chills, fatigue and fever.   HENT:  Negative for congestion, ear pain, postnasal drip, rhinorrhea, sinus pressure, sore throat and trouble swallowing.    Eyes:  Negative for pain and visual disturbance.   Respiratory:  Negative for cough, shortness of breath and wheezing.    Cardiovascular:  Negative for chest pain, palpitations and leg swelling.   Gastrointestinal:  Negative for abdominal pain, nausea and vomiting.   Genitourinary:  Negative for difficulty urinating, dysuria and hematuria.   Musculoskeletal:  Positive for arthralgias. Negative for back pain.   Skin:  Negative for color change, rash and wound.   Neurological:  Negative for dizziness, weakness, light-headedness, numbness and headaches.   Psychiatric/Behavioral:  Negative for dysphoric mood and sleep disturbance. The patient is not nervous/anxious.    All other systems reviewed and are negative.        Past Medical History:   Diagnosis Date    Arthritis     Hyperlipidemia     Hypertension          Current Outpatient Medications:     ergocalciferol (ERGOCALCIFEROL) 1.25 MG (80313 UT) capsule, Take 1 capsule (50,000 Units total) by mouth once a week, Disp: 12 capsule, Rfl: 1    rosuvastatin (CRESTOR) 40 MG tablet, Take 1 tablet (40 mg total) by mouth daily, Disp: 90 tablet, Rfl: 1    topiramate (Topamax) 50 MG tablet, Take 1 tablet (50 mg total) by mouth daily for 14 days, THEN 1 tablet (50 mg total) every 12 (twelve) hours., Disp: 166 tablet, Rfl: 0    valsartan-hydrochlorothiazide (DIOVAN-HCT) 160-12.5 MG per tablet, Take 1 tablet by mouth daily, Disp: 30 tablet, Rfl: 0    No Known Allergies    Social History   History reviewed. No pertinent surgical history.  Family History  "  Problem Relation Age of Onset    Arthritis Mother     Heart attack Mother 63    Hypertension Father     Heart disease Father     Prostate cancer Father     Diabetes Father     No Known Problems Maternal Grandmother     No Known Problems Maternal Grandfather     No Known Problems Paternal Grandmother     No Known Problems Paternal Grandfather     Cancer Maternal Aunt         not sure which type    No Known Problems Maternal Aunt     No Known Problems Maternal Aunt     No Known Problems Maternal Aunt     No Known Problems Paternal Aunt        Objective:  /76 (BP Location: Right arm, Patient Position: Sitting, Cuff Size: Large)   Pulse 90   Temp 98.4 °F (36.9 °C) (Temporal)   Ht 5' 1.85\" (1.571 m)   Wt (!) 147 kg (324 lb 9.6 oz)   LMP 08/15/2023 (Exact Date)   SpO2 97%   BMI 59.66 kg/m²      Physical Exam  Vitals and nursing note reviewed.   Constitutional:       General: She is not in acute distress.     Appearance: Normal appearance. She is obese. She is not ill-appearing.   HENT:      Head: Normocephalic and atraumatic.      Right Ear: Tympanic membrane, ear canal and external ear normal.      Left Ear: Tympanic membrane, ear canal and external ear normal.      Nose: Nose normal.      Mouth/Throat:      Mouth: Mucous membranes are moist.      Pharynx: Oropharynx is clear. No posterior oropharyngeal erythema.   Eyes:      General: No scleral icterus.     Conjunctiva/sclera: Conjunctivae normal.      Pupils: Pupils are equal, round, and reactive to light.   Cardiovascular:      Rate and Rhythm: Normal rate and regular rhythm.      Heart sounds: Normal heart sounds. No murmur heard.     No friction rub. No gallop.   Pulmonary:      Effort: Pulmonary effort is normal. No respiratory distress.      Breath sounds: Normal breath sounds. No wheezing, rhonchi or rales.   Chest:      Chest wall: No tenderness.   Musculoskeletal:      Cervical back: Normal range of motion and neck supple. No tenderness.      " Right lower leg: No edema.      Left lower leg: No edema.   Lymphadenopathy:      Cervical: No cervical adenopathy.   Skin:     General: Skin is warm and dry.      Coloration: Skin is not pale.      Findings: No erythema.   Neurological:      General: No focal deficit present.      Mental Status: She is alert and oriented to person, place, and time. Mental status is at baseline.   Psychiatric:         Mood and Affect: Mood normal.         Behavior: Behavior normal.           Disclaimer: This note was generated with voice recognition software.  Phonetic, grammatical, and spelling errors may be present as a result.  Please contact provider with any concerns or questions

## 2024-07-19 NOTE — ASSESSMENT & PLAN NOTE
Controlled. Continue current regimen  Advised to monitor blood pressure at home.  Goal BP is < 130/80.  Contact our office for consistent elevations.    Recommend low sodium diet.  Exercise 30 minutes 5 times a week as tolerated.  Recommend yearly eye exam.

## 2024-08-12 ENCOUNTER — TELEPHONE (OUTPATIENT)
Age: 54
End: 2024-08-12

## 2024-08-12 NOTE — TELEPHONE ENCOUNTER
08/12/24  Screened by: Lory Kang MA    Referring Provider     Pre- Screening:     There is no height or weight on file to calculate BMI.  Has patient been referred for a routine screening Colonoscopy? yes  Is the patient between 45-75 years old? yes      Previous Colonoscopy no   If yes:    Date:     Facility:     Reason:       Does the patient want to see a Gastroenterologist prior to their procedure OR are they having any GI symptoms? no    Has the patient been hospitalized or had abdominal surgery in the past 6 months? no    Does the patient use supplemental oxygen? no    Does the patient take Coumadin, Lovenox, Plavix, Elliquis, Xarelto, or other blood thinning medication? no    Has the patient had a stroke, cardiac event, or stent placed in the past year? no      If patient is between 45yrs - 49yrs, please advise patient that we will have to confirm benefits & coverage with their insurance company for a routine screening colonoscopy.

## 2024-09-25 ENCOUNTER — TELEPHONE (OUTPATIENT)
Age: 54
End: 2024-09-25

## 2024-10-22 ENCOUNTER — RA CDI HCC (OUTPATIENT)
Dept: OTHER | Facility: HOSPITAL | Age: 54
End: 2024-10-22

## 2024-10-22 NOTE — PROGRESS NOTES
HCC coding opportunities       Chart reviewed, no opportunity found: CHART REVIEWED, NO OPPORTUNITY FOUND        Patients Insurance        Commercial Insurance: [employer]

## 2024-11-19 ENCOUNTER — TELEPHONE (OUTPATIENT)
Dept: GASTROENTEROLOGY | Facility: AMBULARY SURGERY CENTER | Age: 54
End: 2024-11-19

## 2024-11-24 RX ORDER — SODIUM CHLORIDE, SODIUM LACTATE, POTASSIUM CHLORIDE, CALCIUM CHLORIDE 600; 310; 30; 20 MG/100ML; MG/100ML; MG/100ML; MG/100ML
125 INJECTION, SOLUTION INTRAVENOUS CONTINUOUS
OUTPATIENT
Start: 2024-11-24

## 2025-06-05 DIAGNOSIS — E78.2 MIXED HYPERLIPIDEMIA: ICD-10-CM

## 2025-06-05 DIAGNOSIS — I10 PRIMARY HYPERTENSION: ICD-10-CM

## 2025-06-06 RX ORDER — ROSUVASTATIN CALCIUM 40 MG/1
40 TABLET, COATED ORAL DAILY
Qty: 90 TABLET | Refills: 1 | Status: SHIPPED | OUTPATIENT
Start: 2025-06-06

## 2025-06-06 RX ORDER — VALSARTAN AND HYDROCHLOROTHIAZIDE 160; 12.5 MG/1; MG/1
1 TABLET, FILM COATED ORAL DAILY
Qty: 90 TABLET | Refills: 1 | Status: SHIPPED | OUTPATIENT
Start: 2025-06-06 | End: 2025-06-13 | Stop reason: ALTCHOICE

## 2025-06-13 ENCOUNTER — OFFICE VISIT (OUTPATIENT)
Dept: INTERNAL MEDICINE CLINIC | Facility: OTHER | Age: 55
End: 2025-06-13
Payer: COMMERCIAL

## 2025-06-13 VITALS
DIASTOLIC BLOOD PRESSURE: 92 MMHG | HEART RATE: 74 BPM | BODY MASS INDEX: 53.92 KG/M2 | SYSTOLIC BLOOD PRESSURE: 128 MMHG | TEMPERATURE: 98.7 F | WEIGHT: 293 LBS | OXYGEN SATURATION: 99 % | HEIGHT: 62 IN

## 2025-06-13 DIAGNOSIS — Z12.12 SCREENING FOR COLORECTAL CANCER: ICD-10-CM

## 2025-06-13 DIAGNOSIS — M25.562 CHRONIC PAIN OF BOTH KNEES: ICD-10-CM

## 2025-06-13 DIAGNOSIS — G89.29 CHRONIC PAIN OF BOTH KNEES: ICD-10-CM

## 2025-06-13 DIAGNOSIS — M25.561 CHRONIC PAIN OF BOTH KNEES: ICD-10-CM

## 2025-06-13 DIAGNOSIS — E66.01 MORBID OBESITY WITH BMI OF 60.0-69.9, ADULT (HCC): ICD-10-CM

## 2025-06-13 DIAGNOSIS — I10 PRIMARY HYPERTENSION: ICD-10-CM

## 2025-06-13 DIAGNOSIS — F41.9 ANXIETY AND DEPRESSION: ICD-10-CM

## 2025-06-13 DIAGNOSIS — Z12.11 SCREENING FOR COLORECTAL CANCER: ICD-10-CM

## 2025-06-13 DIAGNOSIS — E78.2 MIXED HYPERLIPIDEMIA: ICD-10-CM

## 2025-06-13 DIAGNOSIS — Z12.31 ENCOUNTER FOR SCREENING MAMMOGRAM FOR BREAST CANCER: ICD-10-CM

## 2025-06-13 DIAGNOSIS — F32.A ANXIETY AND DEPRESSION: ICD-10-CM

## 2025-06-13 DIAGNOSIS — Z00.00 ANNUAL PHYSICAL EXAM: Primary | ICD-10-CM

## 2025-06-13 DIAGNOSIS — E55.9 VITAMIN D DEFICIENCY: ICD-10-CM

## 2025-06-13 PROCEDURE — 99214 OFFICE O/P EST MOD 30 MIN: CPT

## 2025-06-13 PROCEDURE — 99396 PREV VISIT EST AGE 40-64: CPT

## 2025-06-13 RX ORDER — ERGOCALCIFEROL 1.25 MG/1
50000 CAPSULE ORAL WEEKLY
Qty: 12 CAPSULE | Refills: 1 | Status: SHIPPED | OUTPATIENT
Start: 2025-06-13

## 2025-06-13 RX ORDER — TOPIRAMATE 25 MG/1
TABLET, FILM COATED ORAL
Qty: 166 TABLET | Refills: 0 | Status: SHIPPED | OUTPATIENT
Start: 2025-06-13 | End: 2025-09-11

## 2025-06-13 RX ORDER — VALSARTAN 80 MG/1
80 TABLET ORAL DAILY
Qty: 100 TABLET | Refills: 0 | Status: SHIPPED | OUTPATIENT
Start: 2025-06-13

## 2025-06-13 NOTE — ASSESSMENT & PLAN NOTE
Starting BMI 64.56, starting weight 353 LBS  Discussed weight loss medication with patient  Discussed uses and side effects of various medications including including metformin, Wellbutrin, Topamax, phentermine, GLP-1 agonists.  Patient elects to try Topamax  Discussed healthy diet and exercise habits  Recommend follow-up with weight management  Follow-up 1 month    Orders:    Hemoglobin A1C; Future    topiramate (Topamax) 25 mg tablet; Take 1 tablet (25 mg total) by mouth daily for 14 days, THEN 1 tablet (25 mg total) every 12 (twelve) hours.    Ambulatory Referral to Weight Management; Future

## 2025-06-13 NOTE — ASSESSMENT & PLAN NOTE
Update lipid panel.  Continue rosuvastatin 40 mg daily  Orders:    Lipid Panel with Direct LDL reflex; Future

## 2025-06-13 NOTE — PATIENT INSTRUCTIONS
"Weight management: Please contact us at [phone] to schedule your appointment.    Patient Education     Routine physical for adults   The Basics   Written by the doctors and editors at [facility]   What is a physical? -- A physical is a routine visit, or \"check-up,\" with your doctor. You might also hear it called a \"wellness visit\" or \"preventive visit.\"  During each visit, the doctor will:   Ask about your physical and mental health   Ask about your habits, behaviors, and lifestyle   Do an exam   Give you vaccines if needed   Talk to you about any medicines you take   Give advice about your health   Answer your questions  Getting regular check-ups is an important part of taking care of your health. It can help your doctor find and treat any problems you have. But it's also important for preventing health problems.  A routine physical is different from a \"sick visit.\" A sick visit is when you see a doctor because of a health concern or problem. Since physicals are scheduled ahead of time, you can think about what you want to ask the doctor.  How often should I get a physical? -- It depends on your age and health. In general, for people age 21 years and older:   If you are younger than 50 years, you might be able to get a physical every 3 years.   If you are 50 years or older, your doctor might recommend a physical every year.  If you have an ongoing health condition, like diabetes or high blood pressure, your doctor will probably want to see you more often.  What happens during a physical? -- In general, each visit will include:   Physical exam - The doctor or nurse will check your height, weight, heart rate, and blood pressure. They will also look at your eyes and ears. They will ask about how you are feeling and whether you have any symptoms that bother you.   Medicines - It's a good idea to bring a list of all the medicines you take to each doctor visit. Your doctor will talk to you about your medicines and " "answer any questions. Tell them if you are having any side effects that bother you. You should also tell them if you are having trouble paying for any of your medicines.   Habits and behaviors - This includes:   Your diet   Your exercise habits   Whether you smoke, drink alcohol, or use drugs   Whether you are sexually active   Whether you feel safe at home  Your doctor will talk to you about things you can do to improve your health and lower your risk of health problems. They will also offer help and support. For example, if you want to quit smoking, they can give you advice and might prescribe medicines. If you want to improve your diet or get more physical activity, they can help you with this, too.   Lab tests, if needed - The tests you get will depend on your age and situation. For example, your doctor might want to check your:   Cholesterol   Blood sugar   Iron level   Vaccines - The recommended vaccines will depend on your age, health, and what vaccines you already had. Vaccines are very important because they can prevent certain serious or deadly infections.   Discussion of screening - \"Screening\" means checking for diseases or other health problems before they cause symptoms. Your doctor can recommend screening based on your age, risk, and preferences. This might include tests to check for:   Cancer, such as breast, prostate, cervical, ovarian, colorectal, prostate, lung, or skin cancer   Sexually transmitted infections, such as chlamydia and gonorrhea   Mental health conditions like depression and anxiety  Your doctor will talk to you about the different types of screening tests. They can help you decide which screenings to have. They can also explain what the results might mean.   Answering questions - The physical is a good time to ask the doctor or nurse questions about your health. If needed, they can refer you to other doctors or specialists, too.  Adults older than 65 years often need other care, " too. As you get older, your doctor will talk to you about:   How to prevent falling at home   Hearing or vision tests   Memory testing   How to take your medicines safely   Making sure that you have the help and support you need at home  All topics are updated as new evidence becomes available and our peer review process is complete.  This topic retrieved from [employer] on: May 02, 2024.  Topic 277614 Version 1.0  Release: 32.4.3 - C32.122  © 2024 UpToDate, Inc. and/or its affiliates. All rights reserved.  Consumer Information Use and Disclaimer   Disclaimer: This generalized information is a limited summary of diagnosis, treatment, and/or medication information. It is not meant to be comprehensive and should be used as a tool to help the user understand and/or assess potential diagnostic and treatment options. It does NOT include all information about conditions, treatments, medications, side effects, or risks that may apply to a specific patient. It is not intended to be medical advice or a substitute for the medical advice, diagnosis, or treatment of a health care provider based on the health care provider's examination and assessment of a patient's specific and unique circumstances. Patients must speak with a health care provider for complete information about their health, medical questions, and treatment options, including any risks or benefits regarding use of medications. This information does not endorse any treatments or medications as safe, effective, or approved for treating a specific patient. UpToDate, Inc. and its affiliates disclaim any warranty or liability relating to this information or the use thereof.The use of this information is governed by the Terms of Use, available at https://www.wolterskluwer.com/en/know/clinical-effectiveness-terms. 2024© UpToDate, Inc. and its affiliates and/or licensors. All rights reserved.  Copyright   © 2024 UpToDate, Inc. and/or its affiliates. All rights  reserved.

## 2025-06-13 NOTE — PROGRESS NOTES
Adult Annual Physical  Name: Leora Lemus      : 1970      MRN: [id number]  Encounter Provider: Phuong Good PA-C  Encounter Date: 2025   Encounter department: [facility]    :  Assessment & Plan  Vitamin D deficiency  Restart vitamin D supplementation update labs  Orders:    ergocalciferol (ERGOCALCIFEROL) 1.25 MG (95352 UT) capsule; Take 1 capsule (50,000 Units total) by mouth once a week    Vitamin D 25 hydroxy; Future    Annual physical exam  Discussed healthy diet and exercise habits  Discussed appropriate age based screenings  Immunizations reviewed.  Patient declines today but will consider update in future  Routine fasting labs ordered       Morbid obesity with BMI of 60.0-69.9, adult (HCC)  Starting BMI 64.56, starting weight 353 LBS  Discussed weight loss medication with patient  Discussed uses and side effects of various medications including including metformin, Wellbutrin, Topamax, phentermine, GLP-1 agonists.  Patient elects to try Topamax  Discussed healthy diet and exercise habits  Recommend follow-up with weight management  Follow-up 1 month    Orders:    Hemoglobin A1C; Future    topiramate (Topamax) 25 mg tablet; Take 1 tablet (25 mg total) by mouth daily for 14 days, THEN 1 tablet (25 mg total) every 12 (twelve) hours.    Ambulatory Referral to Weight Management; Future    Mixed hyperlipidemia  Update lipid panel.  Continue rosuvastatin 40 mg daily  Orders:    Lipid Panel with Direct LDL reflex; Future    Primary hypertension  Blood pressure mildly elevated, although patient admits that she was not on antihypertensive  Given current blood pressure will restart antihypertensive at lower dose.  Restart valsartan 80 mg daily  Recommend low-salt diet  Follow-up 1 month      Orders:    Comprehensive metabolic panel; Future    TSH, 3rd generation with Free T4 reflex; Future    CBC and differential; Future    valsartan (DIOVAN) 80 mg tablet; Take 1  tablet (80 mg total) by mouth daily    Screening for colorectal cancer    Orders:    Ambulatory Referral to Gastroenterology; Future    Encounter for screening mammogram for breast cancer    Orders:    Mammo screening bilateral w 3d and cad; Future    Chronic pain of both knees  Continue Tylenol/ibuprofen as needed  Encourage weight loss  Will update x-rays  Orders:    XR knee bilateral ap standing; Future    Anxiety and depression  Stable off Lexapro             Preventive Screenings:  - Diabetes Screening: screening up-to-date  - Cholesterol Screening: screening not indicated and has hyperlipidemia   - Hepatitis C screening: screening up-to-date   - HIV screening: screening up-to-date   - Cervical cancer screening: screening up-to-date   - Breast cancer screening: screening up-to-date   - Colon cancer screening: risks/benefits discussed and orders placed   - Lung cancer screening: screening not indicated     Immunizations:  - Immunizations due: Prevnar 20, Tdap and Zoster (Shingrix)  - Risks/benefits immunizations discussed    - The patient declines recommended vaccines currently despite my recommendations      Counseling/Anticipatory Guidance:  - Alcohol: discussed moderation in alcohol intake and recommendations for healthy alcohol use.   - Drug use: discussed harms of illicit drug use and how it can negatively impact mental/physical health.   - Tobacco use: discussed harms of tobacco use and management options for quitting.   - Dental health: discussed importance of regular tooth brushing, flossing, and dental visits.   - Sexual health: discussed sexually transmitted diseases, partner selection, use of condoms, avoidance of unintended pregnancy, and contraceptive alternatives.   - Diet: discussed recommendations for a healthy/well-balanced diet.   - Exercise: the importance of regular exercise/physical activity was discussed. Recommend exercise 3-5 times per week for at least 30 minutes.   - Injury prevention:  discussed safety/seat belts, safety helmets, smoke detectors, carbon monoxide detectors, and smoking near bedding or upholstery.       Depression Screening and Follow-up Plan: Patient was screened for depression during today's encounter. They screened negative with a PHQ-9 score of 2.          History of Present Illness     Adult Annual Physical:  Patient presents for annual physical. Patient is a 55-year-old female presenting to the office for overdue follow-up  Patient has concerns for bilateral lower extremity pain, notes that this is chronic  She does have a history of osteoarthritis to bilateral knees  She is interested in restarting her medications as she has been out for several months.     Diet and Physical Activity:  - Diet/Nutrition: poor diet and frequent junk food.  - Exercise: walking. more walking with work    Depression Screening:    - PHQ-9 Score: 2    General Health:  - Sleep: sleeps well. nigth sweats  - Vision: wears glasses and most recent eye exam < 1 year ago.    /GYN Health:  - Follows with GYN: yes.   - Menopause: postmenopausal.     Review of Systems   Constitutional:  Negative for chills, fatigue and fever.   HENT:  Negative for congestion, ear pain, postnasal drip, rhinorrhea, sinus pressure, sore throat and trouble swallowing.    Eyes:  Negative for pain and visual disturbance.   Respiratory:  Negative for cough, shortness of breath and wheezing.    Cardiovascular:  Negative for chest pain, palpitations and leg swelling.   Gastrointestinal:  Negative for abdominal pain, constipation, diarrhea, nausea and vomiting.   Genitourinary:  Negative for difficulty urinating, dysuria, frequency, hematuria and urgency.   Musculoskeletal:  Positive for arthralgias. Negative for back pain.   Neurological:  Negative for dizziness, weakness, light-headedness, numbness and headaches.   Psychiatric/Behavioral:  Negative for dysphoric mood and sleep disturbance. The patient is not nervous/anxious.    All  "other systems reviewed and are negative.    Medical History Reviewed by provider this encounter:  Tobacco  Allergies  Meds  Problems  Med Hx  Surg Hx  Fam Hx     .    Objective   /92 (BP Location: Other (Comment), Patient Position: Sitting, Cuff Size: Standard) Comment (BP Location): left wrist  Pulse 74   Temp 98.7 °F (37.1 °C) (Temporal)   Ht 5' 2\" (1.575 m)   Wt (!) 160 kg (353 lb)   LMP 08/15/2023 (Exact Date)   SpO2 99%   BMI 64.56 kg/m²     Physical Exam  Vitals and nursing note reviewed.   Constitutional:       General: She is not in acute distress.     Appearance: She is obese. She is not ill-appearing.   HENT:      Head: Normocephalic and atraumatic.      Right Ear: External ear normal.      Left Ear: External ear normal.      Nose: Nose normal.      Mouth/Throat:      Mouth: Mucous membranes are moist.      Pharynx: Oropharynx is clear.     Eyes:      General: No scleral icterus.     Conjunctiva/sclera: Conjunctivae normal.      Pupils: Pupils are equal, round, and reactive to light.       Cardiovascular:      Rate and Rhythm: Normal rate and regular rhythm.      Heart sounds: Normal heart sounds. No murmur heard.     No friction rub. No gallop.   Pulmonary:      Effort: Pulmonary effort is normal. No respiratory distress.      Breath sounds: Normal breath sounds. No wheezing, rhonchi or rales.   Chest:      Chest wall: No tenderness.     Musculoskeletal:      Right knee: No bony tenderness. Normal range of motion. No tenderness.      Left knee: No bony tenderness. Normal range of motion. No tenderness.      Comments: Exam limited by body habitus     Skin:     General: Skin is warm and dry.      Coloration: Skin is not pale.      Findings: No erythema.     Neurological:      General: No focal deficit present.      Mental Status: She is alert and oriented to person, place, and time. Mental status is at baseline.     Psychiatric:         Mood and Affect: Mood normal.         Behavior: " Behavior normal.           Disclaimer: This note was generated with voice recognition software.  Phonetic, grammatical, and spelling errors may be present as a result.  Please contact provider with any concerns or questions

## 2025-06-13 NOTE — ASSESSMENT & PLAN NOTE
Restart vitamin D supplementation update labs  Orders:    ergocalciferol (ERGOCALCIFEROL) 1.25 MG (72361 UT) capsule; Take 1 capsule (50,000 Units total) by mouth once a week    Vitamin D 25 hydroxy; Future

## 2025-07-07 ENCOUNTER — OFFICE VISIT (OUTPATIENT)
Dept: INTERNAL MEDICINE CLINIC | Age: 55
End: 2025-07-07
Payer: COMMERCIAL

## 2025-07-07 VITALS
BODY MASS INDEX: 53.92 KG/M2 | DIASTOLIC BLOOD PRESSURE: 90 MMHG | HEART RATE: 91 BPM | HEIGHT: 62 IN | TEMPERATURE: 98.8 F | SYSTOLIC BLOOD PRESSURE: 138 MMHG | WEIGHT: 293 LBS | OXYGEN SATURATION: 98 %

## 2025-07-07 DIAGNOSIS — G47.33 OBSTRUCTIVE SLEEP APNEA SYNDROME: ICD-10-CM

## 2025-07-07 DIAGNOSIS — I10 PRIMARY HYPERTENSION: Primary | ICD-10-CM

## 2025-07-07 PROCEDURE — 99214 OFFICE O/P EST MOD 30 MIN: CPT | Performed by: INTERNAL MEDICINE

## 2025-07-07 RX ORDER — VALSARTAN AND HYDROCHLOROTHIAZIDE 80; 12.5 MG/1; MG/1
1 TABLET, FILM COATED ORAL DAILY
Qty: 100 TABLET | Refills: 3 | Status: SHIPPED | OUTPATIENT
Start: 2025-07-07

## 2025-07-07 RX ORDER — TIRZEPATIDE 2.5 MG/.5ML
2.5 INJECTION, SOLUTION SUBCUTANEOUS WEEKLY
Qty: 2 ML | Refills: 0 | Status: SHIPPED | OUTPATIENT
Start: 2025-07-07 | End: 2025-08-04

## 2025-07-07 NOTE — PROGRESS NOTES
Name: Leora Lemus      : 1970      MRN: [id number]  Encounter Provider: Tamela Bella MD  Encounter Date: 2025   Encounter department: [facility]  :  Assessment & Plan  Primary hypertension    Orders:    Ambulatory Referral to Sleep Medicine; Future    valsartan-hydrochlorothiazide (DIOVAN-HCT) 80-12.5 MG per tablet; Take 1 tablet by mouth daily    Obstructive sleep apnea syndrome  Patient with the morning tiredness, increased daytime somnolence, BMI of 60, short neck, problem with the snoring high risk for sleep apnea specially with the poor control of hypertension we will order the sleep study for further evaluation and to prevent pulmonary hypertension.  Also headaches in the morning be secondary to sleep apnea problem  Orders:    Ambulatory Referral to Sleep Medicine; Future    BMI 60.0-69.9, adult (HCC)    Patient was started on Topamax it self Topamax may not cause weight loss unless it is associated with the phentermine.  Patient tried multiple ways of losing weight unable to lose weight also she is not very anxious to get gastric bypass surgery I highly recommend her if she can get this medication which will help her for her sleep apnea hypertension and osteoarthritis  Orders:    Ambulatory Referral to Sleep Medicine; Future    tirzepatide (Zepbound) 2.5 mg/0.5 mL auto-injector; Inject 0.5 mL (2.5 mg total) under the skin once a week for 28 days  This is a very pleasant gentleman 55 years young lady with a history of morbid obesity started this morning and the last evening with the headache the headache was 10 out of 10 she did not take medication it is improved to 3 4 no nausea vomiting diaphoresis.  Discussed with her regarding changing the blood pressure medication from Diovan to Diovan hydrochlorothiazide she is taking Topamax for weight loss which is not approved by itself with the phentermine it is approved for weight loss she does have some headaches so  Topamax can help but otherwise she should be on GLP-1 agonist also she had a history of sleep apnea and I highly recommend to get a sleep study         History of Present Illness   Is a very pleasant    Hypertension  This is a new problem. The current episode started more than 1 year ago. The problem is unchanged. The problem is resistant. Associated symptoms include headaches and shortness of breath (On exertion). Pertinent negatives include no anxiety, blurred vision, chest pain, malaise/fatigue, neck pain, orthopnea, palpitations, peripheral edema, PND or sweats. Agents associated with hypertension include anorectics. Risk factors for coronary artery disease include dyslipidemia, family history, obesity and sedentary lifestyle. Compliance problems include diet and exercise.      Review of Systems   Constitutional:  Negative for chills, fatigue and malaise/fatigue.   HENT:  Negative for congestion, ear pain, hearing loss, postnasal drip, sinus pressure, sore throat and voice change.    Eyes:  Negative for blurred vision, pain, discharge and visual disturbance.   Respiratory:  Positive for shortness of breath (On exertion). Negative for cough and chest tightness.    Cardiovascular:  Negative for chest pain, palpitations, orthopnea, leg swelling and PND.   Gastrointestinal:  Negative for abdominal pain, blood in stool, diarrhea, nausea and rectal pain.   Genitourinary:  Negative for difficulty urinating, dysuria and urgency.   Musculoskeletal:  Negative for arthralgias, joint swelling and neck pain.   Skin:  Negative for rash.   Allergic/Immunologic: Negative for environmental allergies and food allergies.   Neurological:  Positive for headaches. Negative for dizziness, tremors, weakness and numbness.   Hematological:  Negative for adenopathy.   Psychiatric/Behavioral:  Negative for behavioral problems and hallucinations.        Objective   /90 (BP Location: Left arm, Patient Position: Sitting, Cuff Size:  "Large)   Pulse 91   Temp 98.8 °F (37.1 °C) (Temporal)   Ht 5' 2\" (1.575 m)   Wt (!) 157 kg (346 lb 6.4 oz)   LMP 08/15/2023 (Exact Date)   SpO2 98%   BMI 63.36 kg/m²      Physical Exam  Constitutional:       Appearance: She is obese.   HENT:      Head: Normocephalic.     Eyes:      Pupils: Pupils are equal, round, and reactive to light.       Cardiovascular:      Rate and Rhythm: Normal rate and regular rhythm.      Heart sounds: No murmur heard.  Pulmonary:      Breath sounds: Normal breath sounds.   Abdominal:      General: Bowel sounds are normal.      Palpations: Abdomen is soft.     Musculoskeletal:      Cervical back: Normal range of motion.     Skin:     General: Skin is warm.     Neurological:      Mental Status: She is alert and oriented to person, place, and time.     Psychiatric:         Behavior: Behavior normal.         Thought Content: Thought content normal.         "

## 2025-07-08 ENCOUNTER — TRANSCRIBE ORDERS (OUTPATIENT)
Dept: SLEEP CENTER | Facility: CLINIC | Age: 55
End: 2025-07-08

## 2025-07-08 ENCOUNTER — TELEPHONE (OUTPATIENT)
Dept: SLEEP CENTER | Facility: CLINIC | Age: 55
End: 2025-07-08

## 2025-07-08 DIAGNOSIS — G47.33 OSA (OBSTRUCTIVE SLEEP APNEA): ICD-10-CM

## 2025-07-08 DIAGNOSIS — I10 PRIMARY HYPERTENSION: Primary | ICD-10-CM

## 2025-07-08 NOTE — TELEPHONE ENCOUNTER
Referral placed for a home sleep study. Note does not reflect discussion of symptoms listed on order.    Please addend note with discussion of symptoms.     Insurance is very strict when it comes to their documentation rules as far as approving sleep studies; namely, that they do require that the symptoms checked in the order are discussed in the note from the encounter itself.     Ordering and co-signing providers notified.